# Patient Record
Sex: MALE | Race: ASIAN | NOT HISPANIC OR LATINO | URBAN - METROPOLITAN AREA
[De-identification: names, ages, dates, MRNs, and addresses within clinical notes are randomized per-mention and may not be internally consistent; named-entity substitution may affect disease eponyms.]

---

## 2017-08-03 ENCOUNTER — EMERGENCY (EMERGENCY)
Facility: HOSPITAL | Age: 57
LOS: 1 days | Discharge: HOME | End: 2017-08-03
Attending: EMERGENCY MEDICINE | Admitting: EMERGENCY MEDICINE
Payer: MEDICAID

## 2017-08-03 VITALS
TEMPERATURE: 98 F | HEART RATE: 94 BPM | RESPIRATION RATE: 16 BRPM | DIASTOLIC BLOOD PRESSURE: 92 MMHG | OXYGEN SATURATION: 100 % | SYSTOLIC BLOOD PRESSURE: 147 MMHG

## 2017-08-03 LAB
ALBUMIN SERPL ELPH-MCNC: 4.2 G/DL — SIGNIFICANT CHANGE UP (ref 3.3–5)
ALP SERPL-CCNC: 101 U/L — SIGNIFICANT CHANGE UP (ref 40–120)
ALT FLD-CCNC: 216 U/L — HIGH (ref 4–41)
APTT BLD: 34.5 SEC — SIGNIFICANT CHANGE UP (ref 27.5–37.4)
AST SERPL-CCNC: 211 U/L — HIGH (ref 4–40)
BASOPHILS # BLD AUTO: 0.08 K/UL — SIGNIFICANT CHANGE UP (ref 0–0.2)
BASOPHILS NFR BLD AUTO: 1.1 % — SIGNIFICANT CHANGE UP (ref 0–2)
BILIRUB SERPL-MCNC: 0.6 MG/DL — SIGNIFICANT CHANGE UP (ref 0.2–1.2)
BUN SERPL-MCNC: 7 MG/DL — SIGNIFICANT CHANGE UP (ref 7–23)
CALCIUM SERPL-MCNC: 8.9 MG/DL — SIGNIFICANT CHANGE UP (ref 8.4–10.5)
CHLORIDE SERPL-SCNC: 103 MMOL/L — SIGNIFICANT CHANGE UP (ref 98–107)
CK MB BLD-MCNC: 0.4 — SIGNIFICANT CHANGE UP (ref 0–2.5)
CK MB BLD-MCNC: 5 NG/ML — SIGNIFICANT CHANGE UP (ref 1–6.6)
CK SERPL-CCNC: 1216 U/L — HIGH (ref 30–200)
CO2 SERPL-SCNC: 29 MMOL/L — SIGNIFICANT CHANGE UP (ref 22–31)
CREAT SERPL-MCNC: 0.81 MG/DL — SIGNIFICANT CHANGE UP (ref 0.5–1.3)
EOSINOPHIL # BLD AUTO: 0.26 K/UL — SIGNIFICANT CHANGE UP (ref 0–0.5)
EOSINOPHIL NFR BLD AUTO: 3.7 % — SIGNIFICANT CHANGE UP (ref 0–6)
GLUCOSE SERPL-MCNC: 160 MG/DL — HIGH (ref 70–99)
HCT VFR BLD CALC: 43.5 % — SIGNIFICANT CHANGE UP (ref 39–50)
HGB BLD-MCNC: 14.8 G/DL — SIGNIFICANT CHANGE UP (ref 13–17)
IMM GRANULOCYTES # BLD AUTO: 0.01 # — SIGNIFICANT CHANGE UP
IMM GRANULOCYTES NFR BLD AUTO: 0.1 % — SIGNIFICANT CHANGE UP (ref 0–1.5)
INR BLD: 1.02 — SIGNIFICANT CHANGE UP (ref 0.88–1.17)
LIDOCAIN IGE QN: 781.6 U/L — HIGH (ref 7–60)
LYMPHOCYTES # BLD AUTO: 2.95 K/UL — SIGNIFICANT CHANGE UP (ref 1–3.3)
LYMPHOCYTES # BLD AUTO: 41.7 % — SIGNIFICANT CHANGE UP (ref 13–44)
MCHC RBC-ENTMCNC: 32 PG — SIGNIFICANT CHANGE UP (ref 27–34)
MCHC RBC-ENTMCNC: 34 % — SIGNIFICANT CHANGE UP (ref 32–36)
MCV RBC AUTO: 94.2 FL — SIGNIFICANT CHANGE UP (ref 80–100)
MONOCYTES # BLD AUTO: 0.71 K/UL — SIGNIFICANT CHANGE UP (ref 0–0.9)
MONOCYTES NFR BLD AUTO: 10 % — SIGNIFICANT CHANGE UP (ref 2–14)
NEUTROPHILS # BLD AUTO: 3.07 K/UL — SIGNIFICANT CHANGE UP (ref 1.8–7.4)
NEUTROPHILS NFR BLD AUTO: 43.4 % — SIGNIFICANT CHANGE UP (ref 43–77)
NRBC # FLD: 0 — SIGNIFICANT CHANGE UP
PLATELET # BLD AUTO: 212 K/UL — SIGNIFICANT CHANGE UP (ref 150–400)
PMV BLD: 9.4 FL — SIGNIFICANT CHANGE UP (ref 7–13)
POTASSIUM SERPL-MCNC: 3.4 MMOL/L — LOW (ref 3.5–5.3)
POTASSIUM SERPL-SCNC: 3.4 MMOL/L — LOW (ref 3.5–5.3)
PROT SERPL-MCNC: 7.4 G/DL — SIGNIFICANT CHANGE UP (ref 6–8.3)
PROTHROM AB SERPL-ACNC: 11.4 SEC — SIGNIFICANT CHANGE UP (ref 9.8–13.1)
RBC # BLD: 4.62 M/UL — SIGNIFICANT CHANGE UP (ref 4.2–5.8)
RBC # FLD: 15.5 % — HIGH (ref 10.3–14.5)
SODIUM SERPL-SCNC: 147 MMOL/L — HIGH (ref 135–145)
TROPONIN T SERPL-MCNC: < 0.06 NG/ML — SIGNIFICANT CHANGE UP (ref 0–0.06)
TSH SERPL-MCNC: 0.94 UIU/ML — SIGNIFICANT CHANGE UP (ref 0.27–4.2)
WBC # BLD: 7.08 K/UL — SIGNIFICANT CHANGE UP (ref 3.8–10.5)
WBC # FLD AUTO: 7.08 K/UL — SIGNIFICANT CHANGE UP (ref 3.8–10.5)

## 2017-08-03 PROCEDURE — 99284 EMERGENCY DEPT VISIT MOD MDM: CPT

## 2017-08-03 PROCEDURE — 71020: CPT | Mod: 26

## 2017-08-03 RX ORDER — THIAMINE MONONITRATE (VIT B1) 100 MG
100 TABLET ORAL ONCE
Qty: 0 | Refills: 0 | Status: COMPLETED | OUTPATIENT
Start: 2017-08-03 | End: 2017-08-03

## 2017-08-03 RX ORDER — SODIUM CHLORIDE 9 MG/ML
1000 INJECTION INTRAMUSCULAR; INTRAVENOUS; SUBCUTANEOUS ONCE
Qty: 0 | Refills: 0 | Status: COMPLETED | OUTPATIENT
Start: 2017-08-03 | End: 2017-08-03

## 2017-08-03 RX ORDER — ASPIRIN/CALCIUM CARB/MAGNESIUM 324 MG
162 TABLET ORAL ONCE
Qty: 0 | Refills: 0 | Status: COMPLETED | OUTPATIENT
Start: 2017-08-03 | End: 2017-08-03

## 2017-08-03 RX ORDER — FAMOTIDINE 10 MG/ML
20 INJECTION INTRAVENOUS ONCE
Qty: 0 | Refills: 0 | Status: COMPLETED | OUTPATIENT
Start: 2017-08-03 | End: 2017-08-03

## 2017-08-03 RX ADMIN — FAMOTIDINE 20 MILLIGRAM(S): 10 INJECTION INTRAVENOUS at 22:54

## 2017-08-03 RX ADMIN — Medication 30 MILLILITER(S): at 22:54

## 2017-08-03 RX ADMIN — Medication 162 MILLIGRAM(S): at 22:54

## 2017-08-03 RX ADMIN — SODIUM CHLORIDE 1000 MILLILITER(S): 9 INJECTION INTRAMUSCULAR; INTRAVENOUS; SUBCUTANEOUS at 22:54

## 2017-08-03 RX ADMIN — Medication 100 MILLIGRAM(S): at 22:59

## 2017-08-03 NOTE — ED PROVIDER NOTE - CARE PLAN
Principal Discharge DX:	Pancreatitis, unspecified pancreatitis type  Instructions for follow-up, activity and diet:	You were seen in the emergency department for chest pain. Your bloodwork showed signs of pancreatitis and hepatitis as well as an elevated blood alcohol level. You were treated with fluids and observed in the ER until found to be sober. Discussed with you were the association of these lab values with excessive alcohol drinking. Please follow up with GI doctor and consider enrolling in an alcohol rehab program. If your symptoms worsen, you develop fevers, nausea, or vomiting, or you have worsening chest pain please return to the emergency department.  Secondary Diagnosis:	Anxiety

## 2017-08-03 NOTE — ED PROVIDER NOTE - PROGRESS NOTE DETAILS
Even though patient had elevated lipase (unclear if acute or chronic), he had no nausea or vomiting and was able to tolerate PO, so was deemed stable for discharge.

## 2017-08-03 NOTE — ED ADULT NURSE NOTE - OBJECTIVE STATEMENT
pt a&ox3 and ambulatory. pt c/o cp and worsening depression x1 week. pt states he has been feeling depressed and anxious the past week more so than usual.  pt denies SI/HI pt see a psychiatrist and is on clonopin. pt also states he drinks 1 beer a day. pt noted to have unsteady gait while walking into rm. pt denies sob, abdominal pain, nvd, fever/chills. 20g IV placed in rt AC. Labs sent. Will continue to monitor.

## 2017-08-03 NOTE — ED ADULT TRIAGE NOTE - CHIEF COMPLAINT QUOTE
c/o SOB, chest pain non radiating x 4 days, unsteady gait x 2 days, patient drinks alcohol every other day as per brother.  PMH: HTN, depression,

## 2017-08-03 NOTE — ED PROVIDER NOTE - MEDICAL DECISION MAKING DETAILS
58 yo m HTN and anxiety here for pressure-like chest pain. Unlikely ACS given absence of pressure-like cp/SOB/nausea/SAMSON, normal EKG, and relief by carbonated beverage. Likely related to reflux. Will check trops and give aspirin to cover. Pepcid and maalox. CMP, CBC, lipase, TSH, CXR

## 2017-08-03 NOTE — ED PROVIDER NOTE - PLAN OF CARE
You were seen in the emergency department for chest pain. Your bloodwork showed signs of pancreatitis and hepatitis as well as an elevated blood alcohol level. You were treated with fluids and observed in the ER until found to be sober. Discussed with you were the association of these lab values with excessive alcohol drinking. Please follow up with GI doctor and consider enrolling in an alcohol rehab program. If your symptoms worsen, you develop fevers, nausea, or vomiting, or you have worsening chest pain please return to the emergency department.

## 2017-08-03 NOTE — ED PROVIDER NOTE - OBJECTIVE STATEMENT
57-year-old male w history hypertension and anxiety complaining of 2 days chest pain. Patient describes pain as mid-chest, "gas-like", intermittent, non-radiating, relieved by club soda and belching. Denies SOB, palpitations, nausea, cough, abdominal pain. Denies recent fevers or URI symptoms. Is not currently having any pain. Patient's brother also mentions concern that patient seems unsteadily when walking, and points out that he drinks regularly, ~1 pint hard liquor daily.

## 2017-08-04 VITALS
RESPIRATION RATE: 16 BRPM | TEMPERATURE: 98 F | HEART RATE: 87 BPM | DIASTOLIC BLOOD PRESSURE: 87 MMHG | OXYGEN SATURATION: 95 % | SYSTOLIC BLOOD PRESSURE: 154 MMHG

## 2017-08-04 LAB
APAP SERPL-MCNC: < 15 UG/ML — LOW (ref 15–25)
BARBITURATES MEASUREMENT: NEGATIVE — SIGNIFICANT CHANGE UP
BENZODIAZ SERPL-MCNC: NEGATIVE — SIGNIFICANT CHANGE UP
ETHANOL BLD-MCNC: 352 MG/DL — HIGH
SALICYLATES SERPL-MCNC: < 5 MG/DL — LOW (ref 15–30)

## 2017-08-04 RX ORDER — SODIUM CHLORIDE 9 MG/ML
1000 INJECTION INTRAMUSCULAR; INTRAVENOUS; SUBCUTANEOUS ONCE
Qty: 0 | Refills: 0 | Status: COMPLETED | OUTPATIENT
Start: 2017-08-04 | End: 2017-08-04

## 2017-08-04 RX ADMIN — SODIUM CHLORIDE 1000 MILLILITER(S): 9 INJECTION INTRAMUSCULAR; INTRAVENOUS; SUBCUTANEOUS at 00:52

## 2018-09-06 ENCOUNTER — INPATIENT (INPATIENT)
Facility: HOSPITAL | Age: 58
LOS: 3 days | Discharge: HOME | DRG: 812 | End: 2018-09-10
Attending: HOSPITALIST | Admitting: HOSPITALIST
Payer: COMMERCIAL

## 2018-09-06 VITALS
TEMPERATURE: 98 F | OXYGEN SATURATION: 97 % | HEIGHT: 68 IN | WEIGHT: 149.91 LBS | HEART RATE: 76 BPM | DIASTOLIC BLOOD PRESSURE: 66 MMHG | RESPIRATION RATE: 18 BRPM | SYSTOLIC BLOOD PRESSURE: 110 MMHG

## 2018-09-06 NOTE — ED ADULT NURSE NOTE - NSIMPLEMENTINTERV_GEN_ALL_ED
Implemented All Fall Risk Interventions:  [place] to call system. Call bell, personal items and telephone within reach. Instruct patient to call for assistance. Room bathroom lighting operational. Non-slip footwear when patient is off stretcher. Physically safe environment: no spills, clutter or unnecessary equipment. Stretcher in lowest position, wheels locked, appropriate side rails in place. Provide visual cue, wrist band, yellow gown, etc. Monitor gait and stability. Monitor for mental status changes and reorient to person, place, and time. Review medications for side effects contributing to fall risk. Reinforce activity limits and safety measures with patient and family.

## 2018-09-06 NOTE — ED ADULT NURSE NOTE - ED STAT RN HANDOFF DETAILS
received critiocal  result form lab poptasium 2.5 dr. hodges aware pt.remained  stable. started 1 st unit of PRBC at 5: 40am via pump with no a/r noted.received critical  result form lab potasium 2.5 dr. hodges aware. medication given as ordered.pt.not in distress

## 2018-09-07 DIAGNOSIS — F41.9 ANXIETY DISORDER, UNSPECIFIED: ICD-10-CM

## 2018-09-07 DIAGNOSIS — Z29.9 ENCOUNTER FOR PROPHYLACTIC MEASURES, UNSPECIFIED: ICD-10-CM

## 2018-09-07 DIAGNOSIS — F10.10 ALCOHOL ABUSE, UNCOMPLICATED: ICD-10-CM

## 2018-09-07 DIAGNOSIS — E88.09 OTHER DISORDERS OF PLASMA-PROTEIN METABOLISM, NOT ELSEWHERE CLASSIFIED: ICD-10-CM

## 2018-09-07 DIAGNOSIS — D64.9 ANEMIA, UNSPECIFIED: ICD-10-CM

## 2018-09-07 DIAGNOSIS — E87.6 HYPOKALEMIA: ICD-10-CM

## 2018-09-07 DIAGNOSIS — I10 ESSENTIAL (PRIMARY) HYPERTENSION: ICD-10-CM

## 2018-09-07 LAB
24R-OH-CALCIDIOL SERPL-MCNC: 26.4 NG/ML — LOW (ref 30–80)
ABO RH CONFIRMATION: SIGNIFICANT CHANGE UP
ALBUMIN SERPL ELPH-MCNC: 2.4 G/DL — LOW (ref 3.5–5)
ALP SERPL-CCNC: 159 U/L — HIGH (ref 40–120)
ALT FLD-CCNC: 39 U/L DA — SIGNIFICANT CHANGE UP (ref 10–60)
ANION GAP SERPL CALC-SCNC: 9 MMOL/L — SIGNIFICANT CHANGE UP (ref 5–17)
APTT BLD: 34.8 SEC — SIGNIFICANT CHANGE UP (ref 27.5–37.4)
AST SERPL-CCNC: 83 U/L — HIGH (ref 10–40)
BASOPHILS # BLD AUTO: 0.1 K/UL — SIGNIFICANT CHANGE UP (ref 0–0.2)
BASOPHILS # BLD AUTO: 0.1 K/UL — SIGNIFICANT CHANGE UP (ref 0–0.2)
BASOPHILS NFR BLD AUTO: 1.6 % — SIGNIFICANT CHANGE UP (ref 0–2)
BASOPHILS NFR BLD AUTO: 1.9 % — SIGNIFICANT CHANGE UP (ref 0–2)
BILIRUB SERPL-MCNC: 1.1 MG/DL — SIGNIFICANT CHANGE UP (ref 0.2–1.2)
BUN SERPL-MCNC: 4 MG/DL — LOW (ref 7–18)
BUN SERPL-MCNC: 6 MG/DL — LOW (ref 7–18)
BUN SERPL-MCNC: 7 MG/DL — SIGNIFICANT CHANGE UP (ref 7–18)
CALCIUM SERPL-MCNC: 7.6 MG/DL — LOW (ref 8.4–10.5)
CALCIUM SERPL-MCNC: 7.7 MG/DL — LOW (ref 8.4–10.5)
CALCIUM SERPL-MCNC: 7.8 MG/DL — LOW (ref 8.4–10.5)
CHLORIDE SERPL-SCNC: 105 MMOL/L — SIGNIFICANT CHANGE UP (ref 96–108)
CHLORIDE SERPL-SCNC: 105 MMOL/L — SIGNIFICANT CHANGE UP (ref 96–108)
CHLORIDE SERPL-SCNC: 111 MMOL/L — HIGH (ref 96–108)
CHOLEST SERPL-MCNC: 168 MG/DL — SIGNIFICANT CHANGE UP (ref 10–199)
CO2 SERPL-SCNC: 26 MMOL/L — SIGNIFICANT CHANGE UP (ref 22–31)
CO2 SERPL-SCNC: 28 MMOL/L — SIGNIFICANT CHANGE UP (ref 22–31)
CO2 SERPL-SCNC: 28 MMOL/L — SIGNIFICANT CHANGE UP (ref 22–31)
CREAT SERPL-MCNC: 0.56 MG/DL — SIGNIFICANT CHANGE UP (ref 0.5–1.3)
CREAT SERPL-MCNC: 0.69 MG/DL — SIGNIFICANT CHANGE UP (ref 0.5–1.3)
CREAT SERPL-MCNC: 0.71 MG/DL — SIGNIFICANT CHANGE UP (ref 0.5–1.3)
EOSINOPHIL # BLD AUTO: 0.1 K/UL — SIGNIFICANT CHANGE UP (ref 0–0.5)
EOSINOPHIL # BLD AUTO: 0.2 K/UL — SIGNIFICANT CHANGE UP (ref 0–0.5)
EOSINOPHIL NFR BLD AUTO: 1.7 % — SIGNIFICANT CHANGE UP (ref 0–6)
EOSINOPHIL NFR BLD AUTO: 2.8 % — SIGNIFICANT CHANGE UP (ref 0–6)
ETHANOL SERPL-MCNC: 246 MG/DL — HIGH (ref 0–10)
FERRITIN SERPL-MCNC: 32 NG/ML — SIGNIFICANT CHANGE UP (ref 30–400)
GLUCOSE SERPL-MCNC: 109 MG/DL — HIGH (ref 70–99)
GLUCOSE SERPL-MCNC: 90 MG/DL — SIGNIFICANT CHANGE UP (ref 70–99)
GLUCOSE SERPL-MCNC: 98 MG/DL — SIGNIFICANT CHANGE UP (ref 70–99)
HBA1C BLD-MCNC: 4.7 % — SIGNIFICANT CHANGE UP (ref 4–5.6)
HCT VFR BLD CALC: 22.4 % — LOW (ref 39–50)
HCT VFR BLD CALC: 22.7 % — LOW (ref 39–50)
HCT VFR BLD CALC: 28.5 % — LOW (ref 39–50)
HDLC SERPL-MCNC: 49 MG/DL — SIGNIFICANT CHANGE UP
HGB BLD-MCNC: 6.2 G/DL — CRITICAL LOW (ref 13–17)
HGB BLD-MCNC: 6.2 G/DL — CRITICAL LOW (ref 13–17)
HGB BLD-MCNC: 8.3 G/DL — LOW (ref 13–17)
INR BLD: 1.31 RATIO — HIGH (ref 0.88–1.16)
IRON SATN MFR SERPL: 14 UG/DL — LOW (ref 65–170)
IRON SATN MFR SERPL: 3 % — LOW (ref 20–55)
LDH SERPL L TO P-CCNC: 266 U/L — HIGH (ref 120–225)
LIPID PNL WITH DIRECT LDL SERPL: 96 MG/DL — SIGNIFICANT CHANGE UP
LYMPHOCYTES # BLD AUTO: 2.1 K/UL — SIGNIFICANT CHANGE UP (ref 1–3.3)
LYMPHOCYTES # BLD AUTO: 2.6 K/UL — SIGNIFICANT CHANGE UP (ref 1–3.3)
LYMPHOCYTES # BLD AUTO: 26.5 % — SIGNIFICANT CHANGE UP (ref 13–44)
LYMPHOCYTES # BLD AUTO: 32.9 % — SIGNIFICANT CHANGE UP (ref 13–44)
MAGNESIUM SERPL-MCNC: 1.8 MG/DL — SIGNIFICANT CHANGE UP (ref 1.6–2.6)
MAGNESIUM SERPL-MCNC: 1.9 MG/DL — SIGNIFICANT CHANGE UP (ref 1.6–2.6)
MCHC RBC-ENTMCNC: 23.4 PG — LOW (ref 27–34)
MCHC RBC-ENTMCNC: 23.5 PG — LOW (ref 27–34)
MCHC RBC-ENTMCNC: 24.9 PG — LOW (ref 27–34)
MCHC RBC-ENTMCNC: 27.5 GM/DL — LOW (ref 32–36)
MCHC RBC-ENTMCNC: 27.7 GM/DL — LOW (ref 32–36)
MCHC RBC-ENTMCNC: 29.2 GM/DL — LOW (ref 32–36)
MCV RBC AUTO: 85 FL — SIGNIFICANT CHANGE UP (ref 80–100)
MCV RBC AUTO: 85.2 FL — SIGNIFICANT CHANGE UP (ref 80–100)
MCV RBC AUTO: 85.4 FL — SIGNIFICANT CHANGE UP (ref 80–100)
MONOCYTES # BLD AUTO: 0.6 K/UL — SIGNIFICANT CHANGE UP (ref 0–0.9)
MONOCYTES # BLD AUTO: 0.6 K/UL — SIGNIFICANT CHANGE UP (ref 0–0.9)
MONOCYTES NFR BLD AUTO: 7.6 % — SIGNIFICANT CHANGE UP (ref 2–14)
MONOCYTES NFR BLD AUTO: 7.6 % — SIGNIFICANT CHANGE UP (ref 2–14)
NEUTROPHILS # BLD AUTO: 4.3 K/UL — SIGNIFICANT CHANGE UP (ref 1.8–7.4)
NEUTROPHILS # BLD AUTO: 4.9 K/UL — SIGNIFICANT CHANGE UP (ref 1.8–7.4)
NEUTROPHILS NFR BLD AUTO: 55.1 % — SIGNIFICANT CHANGE UP (ref 43–77)
NEUTROPHILS NFR BLD AUTO: 62.3 % — SIGNIFICANT CHANGE UP (ref 43–77)
OB PNL STL: NEGATIVE — SIGNIFICANT CHANGE UP
PHOSPHATE SERPL-MCNC: 3.8 MG/DL — SIGNIFICANT CHANGE UP (ref 2.5–4.5)
PLATELET # BLD AUTO: 217 K/UL — SIGNIFICANT CHANGE UP (ref 150–400)
PLATELET # BLD AUTO: 220 K/UL — SIGNIFICANT CHANGE UP (ref 150–400)
PLATELET # BLD AUTO: 242 K/UL — SIGNIFICANT CHANGE UP (ref 150–400)
POTASSIUM SERPL-MCNC: 2.5 MMOL/L — CRITICAL LOW (ref 3.5–5.3)
POTASSIUM SERPL-MCNC: 2.6 MMOL/L — CRITICAL LOW (ref 3.5–5.3)
POTASSIUM SERPL-MCNC: 3.3 MMOL/L — LOW (ref 3.5–5.3)
POTASSIUM SERPL-SCNC: 2.5 MMOL/L — CRITICAL LOW (ref 3.5–5.3)
POTASSIUM SERPL-SCNC: 2.6 MMOL/L — CRITICAL LOW (ref 3.5–5.3)
POTASSIUM SERPL-SCNC: 3.3 MMOL/L — LOW (ref 3.5–5.3)
PROT SERPL-MCNC: 7.2 G/DL — SIGNIFICANT CHANGE UP (ref 6–8.3)
PROTHROM AB SERPL-ACNC: 14.4 SEC — HIGH (ref 9.8–12.7)
RBC # BLD: 2.64 M/UL — LOW (ref 4.2–5.8)
RBC # BLD: 2.66 M/UL — LOW (ref 4.2–5.8)
RBC # BLD: 3.35 M/UL — LOW (ref 4.2–5.8)
RBC # FLD: 17.6 % — HIGH (ref 10.3–14.5)
RBC # FLD: 18.2 % — HIGH (ref 10.3–14.5)
RBC # FLD: 18.4 % — HIGH (ref 10.3–14.5)
SODIUM SERPL-SCNC: 142 MMOL/L — SIGNIFICANT CHANGE UP (ref 135–145)
SODIUM SERPL-SCNC: 142 MMOL/L — SIGNIFICANT CHANGE UP (ref 135–145)
SODIUM SERPL-SCNC: 146 MMOL/L — HIGH (ref 135–145)
TIBC SERPL-MCNC: 426 UG/DL — SIGNIFICANT CHANGE UP (ref 250–450)
TOTAL CHOLESTEROL/HDL RATIO MEASUREMENT: 3.4 RATIO — SIGNIFICANT CHANGE UP (ref 3.4–9.6)
TRANSFERRIN SERPL-MCNC: 341 MG/DL — SIGNIFICANT CHANGE UP (ref 200–360)
TRIGL SERPL-MCNC: 116 MG/DL — SIGNIFICANT CHANGE UP (ref 10–149)
TSH SERPL-MCNC: 2.12 UU/ML — SIGNIFICANT CHANGE UP (ref 0.34–4.82)
UIBC SERPL-MCNC: 412 UG/DL — HIGH (ref 110–370)
WBC # BLD: 6.1 K/UL — SIGNIFICANT CHANGE UP (ref 3.8–10.5)
WBC # BLD: 7.9 K/UL — SIGNIFICANT CHANGE UP (ref 3.8–10.5)
WBC # BLD: 7.9 K/UL — SIGNIFICANT CHANGE UP (ref 3.8–10.5)
WBC # FLD AUTO: 6.1 K/UL — SIGNIFICANT CHANGE UP (ref 3.8–10.5)
WBC # FLD AUTO: 7.9 K/UL — SIGNIFICANT CHANGE UP (ref 3.8–10.5)
WBC # FLD AUTO: 7.9 K/UL — SIGNIFICANT CHANGE UP (ref 3.8–10.5)

## 2018-09-07 PROCEDURE — 99285 EMERGENCY DEPT VISIT HI MDM: CPT | Mod: 25

## 2018-09-07 PROCEDURE — 99223 1ST HOSP IP/OBS HIGH 75: CPT | Mod: GC

## 2018-09-07 PROCEDURE — 76705 ECHO EXAM OF ABDOMEN: CPT | Mod: 26

## 2018-09-07 RX ORDER — POTASSIUM CHLORIDE 20 MEQ
40 PACKET (EA) ORAL ONCE
Qty: 0 | Refills: 0 | Status: COMPLETED | OUTPATIENT
Start: 2018-09-07 | End: 2018-09-07

## 2018-09-07 RX ORDER — POTASSIUM CHLORIDE 20 MEQ
10 PACKET (EA) ORAL
Qty: 0 | Refills: 0 | Status: COMPLETED | OUTPATIENT
Start: 2018-09-07 | End: 2018-09-07

## 2018-09-07 RX ORDER — SODIUM CHLORIDE 9 MG/ML
1000 INJECTION, SOLUTION INTRAVENOUS
Qty: 0 | Refills: 0 | Status: DISCONTINUED | OUTPATIENT
Start: 2018-09-07 | End: 2018-09-10

## 2018-09-07 RX ORDER — POTASSIUM CHLORIDE 20 MEQ
10 PACKET (EA) ORAL
Qty: 0 | Refills: 0 | Status: DISCONTINUED | OUTPATIENT
Start: 2018-09-07 | End: 2018-09-07

## 2018-09-07 RX ORDER — POTASSIUM CHLORIDE 20 MEQ
20 PACKET (EA) ORAL
Qty: 0 | Refills: 0 | Status: COMPLETED | OUTPATIENT
Start: 2018-09-07 | End: 2018-09-07

## 2018-09-07 RX ORDER — ERGOCALCIFEROL 1.25 MG/1
50000 CAPSULE ORAL
Qty: 0 | Refills: 0 | Status: DISCONTINUED | OUTPATIENT
Start: 2018-09-07 | End: 2018-09-10

## 2018-09-07 RX ORDER — PANTOPRAZOLE SODIUM 20 MG/1
40 TABLET, DELAYED RELEASE ORAL EVERY 12 HOURS
Qty: 0 | Refills: 0 | Status: DISCONTINUED | OUTPATIENT
Start: 2018-09-07 | End: 2018-09-07

## 2018-09-07 RX ORDER — THIAMINE MONONITRATE (VIT B1) 100 MG
100 TABLET ORAL DAILY
Qty: 0 | Refills: 0 | Status: COMPLETED | OUTPATIENT
Start: 2018-09-07 | End: 2018-09-09

## 2018-09-07 RX ORDER — FOLIC ACID 0.8 MG
1 TABLET ORAL DAILY
Qty: 0 | Refills: 0 | Status: DISCONTINUED | OUTPATIENT
Start: 2018-09-07 | End: 2018-09-10

## 2018-09-07 RX ORDER — PANTOPRAZOLE SODIUM 20 MG/1
40 TABLET, DELAYED RELEASE ORAL
Qty: 0 | Refills: 0 | Status: DISCONTINUED | OUTPATIENT
Start: 2018-09-07 | End: 2018-09-10

## 2018-09-07 RX ORDER — PANTOPRAZOLE SODIUM 20 MG/1
40 TABLET, DELAYED RELEASE ORAL EVERY 24 HOURS
Qty: 0 | Refills: 0 | Status: DISCONTINUED | OUTPATIENT
Start: 2018-09-07 | End: 2018-09-07

## 2018-09-07 RX ADMIN — SODIUM CHLORIDE 100 MILLILITER(S): 9 INJECTION, SOLUTION INTRAVENOUS at 10:10

## 2018-09-07 RX ADMIN — Medication 40 MILLIEQUIVALENT(S): at 05:43

## 2018-09-07 RX ADMIN — Medication 20 MILLIEQUIVALENT(S): at 15:23

## 2018-09-07 RX ADMIN — Medication 1 MILLIGRAM(S): at 13:41

## 2018-09-07 RX ADMIN — SODIUM CHLORIDE 80 MILLILITER(S): 9 INJECTION, SOLUTION INTRAVENOUS at 06:00

## 2018-09-07 RX ADMIN — Medication 100 MILLIEQUIVALENT(S): at 07:56

## 2018-09-07 RX ADMIN — Medication 1 MILLIGRAM(S): at 18:42

## 2018-09-07 RX ADMIN — Medication 100 MILLIEQUIVALENT(S): at 09:03

## 2018-09-07 RX ADMIN — PANTOPRAZOLE SODIUM 40 MILLIGRAM(S): 20 TABLET, DELAYED RELEASE ORAL at 13:41

## 2018-09-07 RX ADMIN — Medication 40 MILLIEQUIVALENT(S): at 06:44

## 2018-09-07 RX ADMIN — Medication 100 MILLIGRAM(S): at 15:23

## 2018-09-07 RX ADMIN — Medication 2 MILLIGRAM(S): at 13:46

## 2018-09-07 RX ADMIN — Medication 100 MILLIEQUIVALENT(S): at 05:45

## 2018-09-07 RX ADMIN — Medication 20 MILLIEQUIVALENT(S): at 18:41

## 2018-09-07 RX ADMIN — PANTOPRAZOLE SODIUM 40 MILLIGRAM(S): 20 TABLET, DELAYED RELEASE ORAL at 05:45

## 2018-09-07 NOTE — H&P ADULT - ATTENDING COMMENTS
Patient was seen and examined by myself. Case was discussed with house staff in details. I have reviewed and agree with the plan as outlined above with edits where appropriate. Patient was seen and examined by myself. Case was discussed with house staff in details. I have reviewed and agree with the plan as outlined above with edits where appropriate.    57 y/o M admitted for   1. Anemia with generalizes weakness  2. ETOH abuse with onset of withdrawal symptoms  3. Protein calorie malnutrition  4. Liver cirrhosis due to ETOH  5. Hypoalbuminemia  6. Hypokalemia  7. Ascites  8. Cholelithiasis  9. Poor oral intake    Plan as outlined above.  CIWA-A protocol  Ativan standing and PRN  Monitor hb  ETOH cessation counselling.  Prognosis is guarded.  Discussed with patient 's brother over the phone- longstanding alcoholic and non compliant with medical recommendations, follow up and care.  Patient requested to be discharge however he is in active withdrawal and not stable for discharge,. He verbalizes understanding and is agreeable with need for continued hospitalization at this time.

## 2018-09-07 NOTE — ED PROVIDER NOTE - MEDICAL DECISION MAKING DETAILS
57 yo M with etoh abuse and found to have low Hgb on out patient labs. Sent in for evaluation and possible transfusion.

## 2018-09-07 NOTE — H&P ADULT - ASSESSMENT
58 male with PMH of HTN , Anxiety, was sent in by PCP for low Hb. Patient went to some hospital , was found to have low hb and discharged without any intervention. Patient then went to PCP's office and found to have level of 6.2 and was sent in here.   Pt is somnolent and clinically intoxicated and unable to provide any history.    In Ed , BP : 110/66 mm hg , Hr : 76 , Temp : 97.6 F  cbc shows hb of 6.2 with normal MCV , low MCH/ MCHC and elevated RDW  bmp shows K of 2.6 ; EKG did not reveal any changes       Will admit to telemetry for Symptomatic Anemia , alcohol intoxication and Electrolyte abnormalities.

## 2018-09-07 NOTE — H&P ADULT - PROBLEM SELECTOR PLAN 1
Comes in with low H/H , no obvious signs of Gi /  bleed in history or physical   Occult negative   f/u anemia panel   Likely a combination of [place] form poor po intake and Anemia related to Alcoholism  Keep NPO   Start iv protonix bid  GI consult   Transfuse 1 unit to keep hb >7   Trend cbc Comes in with low H/H , no obvious signs of Gi /  bleed in history or physical   Occult negative   f/u anemia panel   Likely a combination of [place] form poor po intake and Anemia related to Alcoholism  Keep NPO   Start iv protonix  Transfuse 1 unit to keep hb >7   Trend cbc

## 2018-09-07 NOTE — ED PROVIDER NOTE - PROGRESS NOTE DETAILS
Hgb 6.2  1 unit PRBC's ordered. Pt's brother consented.   Will admit for transfusion for symptomatic anemia. Endorsed to MAR.

## 2018-09-07 NOTE — ED PROVIDER NOTE - OBJECTIVE STATEMENT
59 yo M pmh of anxiety/depression, HTN, and etoh abuse presents with family who state that he had out patient labs indicating anemia and his PCP told family to take him to ED. Pt is somnolent and clinically intoxicated and unable to provide any history.

## 2018-09-07 NOTE — ED PROVIDER NOTE - PHYSICAL EXAMINATION
GENERAL: wells appearing, no acute distress, somnolent, arouses to noxious stimuli and makes eye contact but not verbally answering questions, etoh on breath   HEAD: atraumatic   EYES: EOMI, pink conjunctiva   ENT: moist oral mucosa   CARDIAC: RRR, no edema, distal pulses present   RESPIRATORY: lungs CTAB, no increased work of breathing   GASTROINTESTINAL: no abdominal tenderness, no rebound or guarding, bowel sounds presents  GENITOURINARY: no CVA tenderness   MUSCULOSKELETAL: no deformity   NEUROLOGICAL: spontaneous movement of extremities, protecting airway   SKIN: intact   PSYCHIATRIC: cooperative  HEME LYMPH: no lymphadenopathy

## 2018-09-07 NOTE — H&P ADULT - PROBLEM SELECTOR PLAN 3
Likely from poor po intake and alcoholism   Replace aggressively   EKG wnl   f/u bmp closely  Monitor on tele

## 2018-09-07 NOTE — H&P ADULT - PROBLEM SELECTOR PLAN 6
IMPROVE VTE Individual Risk Assessment          RISK                                                          Points  [  ] Previous VTE                                                3  [  ] Thrombophilia                                             2  [  ] Lower limb paralysis                                   2        (unable to hold up >15 seconds)    [  ] Current Cancer                                             2         (within 6 months)  [x  ] Immobilization > 24 hrs                              1  [  ] ICU/CCU stay > 24 hours                             1  [  ] Age > 60                                                         1    IMPROVE VTE Score: 1   High risk , but no chemical ac due to anemia   scd boots Admits to h/o Anxiety   Does not remember his meds   Primary team to address

## 2018-09-07 NOTE — H&P ADULT - PROBLEM SELECTOR PLAN 7
IMPROVE VTE Individual Risk Assessment          RISK                                                          Points  [  ] Previous VTE                                                3  [  ] Thrombophilia                                             2  [  ] Lower limb paralysis                                   2        (unable to hold up >15 seconds)    [  ] Current Cancer                                             2         (within 6 months)  [x  ] Immobilization > 24 hrs                              1  [  ] ICU/CCU stay > 24 hours                             1  [  ] Age > 60                                                         1    IMPROVE VTE Score: 1   High risk , but no chemical ac due to anemia   scd boots

## 2018-09-07 NOTE — H&P ADULT - NSHPPHYSICALEXAM_GEN_ALL_CORE
Vital Signs Last 24 Hrs  T(C): 36.4 (06 Sep 2018 23:11), Max: 36.4 (06 Sep 2018 23:11)  T(F): 97.6 (06 Sep 2018 23:11), Max: 97.6 (06 Sep 2018 23:11)  HR: 76 (06 Sep 2018 23:11) (76 - 76)  BP: 110/66 (06 Sep 2018 23:11) (110/66 - 110/66)  BP(mean): --  RR: 18 (06 Sep 2018 23:11) (18 - 18)  SpO2: 97% (06 Sep 2018 23:11) (97% - 97%)    Constitutional : dishevel  Eyes : EOMI; PERRL; no drainage or redness  Neck: No bruits; no thyromegaly   Back : No deformity or limitation of movement  Respiratory : Breath Sounds equal & clear to percussion & auscultation, no accessory muscle use  Cardiovascular :Regular rate & rhythm, normal S1, S2; no murmurs, gallops or rubs  Gastrointestinal : Soft, non-tender, no hepatosplenomegaly, normal bowel sounds  Extremities : No cyanosis, clubbing or edema  Vascular	: Equal and normal pulses (carotid, dorsalis pedis)  Neurological : Intoxicated , arousable by physical stimulus , does not follow commands   Skin : No lesions; no rash  Musculoskeletal : No joint swelling or deformity; Vital Signs Last 24 Hrs  T(C): 36.4 (06 Sep 2018 23:11), Max: 36.4 (06 Sep 2018 23:11)  T(F): 97.6 (06 Sep 2018 23:11), Max: 97.6 (06 Sep 2018 23:11)  HR: 76 (06 Sep 2018 23:11) (76 - 76)  BP: 110/66 (06 Sep 2018 23:11) (110/66 - 110/66)  BP(mean): --  RR: 18 (06 Sep 2018 23:11) (18 - 18)  SpO2: 97% (06 Sep 2018 23:11) (97% - 97%)    Constitutional : dishevel  Eyes : EOMI; PERRL; no drainage or redness  Neck: No bruits; no thyromegaly   Back : No deformity or limitation of movement  Respiratory : Breath Sounds equal & clear to percussion & auscultation, no accessory muscle use  Cardiovascular :Regular rate & rhythm, normal S1, S2; no murmurs, gallops or rubs  Gastrointestinal : Soft, non-tender, no hepatosplenomegaly, normal bowel sounds  Extremities : No cyanosis, clubbing or edema  Vascular	: Equal and normal pulses (carotid, dorsalis pedis)  Neurological : initial exam , intoxicated ; later woke up now AAO*3   Skin : No lesions; no rash  Musculoskeletal : No joint swelling or deformity;

## 2018-09-07 NOTE — H&P ADULT - PROBLEM SELECTOR PLAN 5
IMPROVE VTE Individual Risk Assessment          RISK                                                          Points  [  ] Previous VTE                                                3  [  ] Thrombophilia                                             2  [  ] Lower limb paralysis                                   2        (unable to hold up >15 seconds)    [  ] Current Cancer                                             2         (within 6 months)  [x  ] Immobilization > 24 hrs                              1  [  ] ICU/CCU stay > 24 hours                             1  [  ] Age > 60                                                         1    IMPROVE VTE Score: 1   High risk , but no chemical ac due to anemia   scd boots Admits to h/o Anxiety   Does not remember his meds   Primary team to address On Amlodipine at home 5 mg OD   Monitor BP

## 2018-09-07 NOTE — H&P ADULT - FAMILY HISTORY
Father  Still living? Unknown  Family history of anxiety disorder, Age at diagnosis: Age Unknown Father  Still living? Unknown  Family history of anxiety disorder, Age at diagnosis: Age Unknown  Family history of diabetes mellitus (DM), Age at diagnosis: Age Unknown

## 2018-09-07 NOTE — H&P ADULT - PROBLEM SELECTOR PLAN 2
Intoxicated , BAL of 280   Will be high risk for withdrawal in next 24-48 hours  CIWA protocol  Ativan 2 q2 prn   Banana bag   Thiamine   Folic acid   Keep NPO for now   Social work consult

## 2018-09-07 NOTE — H&P ADULT - NSHPSOCIALHISTORY_GEN_ALL_CORE
Etoh daily , history was obtained from Brother who accompanied patient says drinks and smokes only occasionally   Denies drugs   Lives with sister

## 2018-09-07 NOTE — H&P ADULT - PROBLEM SELECTOR PLAN 4
On Amlodipine at home , unsure of doses   Primary team to address   Monitor BP On Amlodipine at home 5 mg OD   Monitor BP Albumin of 2.4 with elevated ALT , AST   suggest poor po intake   Would obtain Liver Ultrasound to rule out liver pathology   Nutrition consult

## 2018-09-07 NOTE — H&P ADULT - HISTORY OF PRESENT ILLNESS
58 male 58 male with PMH of HTN , Anxiety, was sent in by PCP for low Hb. Patient went to some hospital , was found to have low hb and discharged without any intervention. Patient then went to PCP's office and found to have level of 6.2 and was sent in here.   Pt is somnolent and clinically intoxicated and unable to provide any history.    In Ed , BP : 110/66 mm hg , Hr : 76 , Temp : 97.6 F  cbc shows hb of 6.2 with normal MCV , low MCH/ MCHC and elevated RDW  bmp shows K of 2.6 ; EKG did not reveal any changes   58 male with PMH of HTN , Anxiety, was sent in by PCP for low Hb. Patient went to some hospital , was found to have low hb and discharged without any intervention. Patient then went to PCP's office and found to have level of 6.2 and was sent in here. Patient was initially very somnolent and unable to provide any history , then woke up a little and was interviewed. Complaints of generalized weakness. Other than that denied any chest pain , sob , palpitations , dizziness , headache , melena , hematemesis or hematuria.     In Ed , BP : 110/66 mm hg , Hr : 76 , Temp : 97.6 F  cbc shows hb of 6.2 with normal MCV , low MCH/ MCHC and elevated RDW  bmp shows K of 2.6 ; EKG did not reveal any changes   58 male with PMH of HTN , Anxiety, was sent in by PCP for low Hb. Patient went to some hospital , was found to have low hb and discharged without any intervention. Patient then went to PCP's office and found to have level of 6.2 and was sent in here. Patient was initially very somnolent and unable to provide any history , then woke up a little and was interviewed. However , history is very limited from patient as intoxicated. Most history obtained from family and chart review. Complaints of generalized weakness. Other than that denied any chest pain , sob , palpitations , dizziness , headache , melena , hematemesis or hematuria.     In Ed , BP : 110/66 mm hg , Hr : 76 , Temp : 97.6 F  cbc shows hb of 6.2 with normal MCV , low MCH/ MCHC and elevated RDW  bmp shows K of 2.6 ; EKG did not reveal any changes

## 2018-09-07 NOTE — H&P ADULT - NSHPLABSRESULTS_GEN_ALL_CORE
6.2    7.9   )-----------( 220      ( 07 Sep 2018 04:21 )             22.7       09-07    142  |  105  |  6<L>  ----------------------------<  98  2.5<LL>   |  28  |  0.71    Ca    7.8<L>      07 Sep 2018 04:04  Phos  3.8     09-07  Mg     1.9     09-07    TPro  7.2  /  Alb  2.4<L>  /  TBili  1.1  /  DBili  x   /  AST  83<H>  /  ALT  39  /  AlkPhos  159<H>  09-07      Alcohol, Blood (09.07.18 @ 04:21)    Alcohol, Blood: 246: TOXIC CONCENTRATIONS (mg/dL):

## 2018-09-08 DIAGNOSIS — K70.30 ALCOHOLIC CIRRHOSIS OF LIVER WITHOUT ASCITES: ICD-10-CM

## 2018-09-08 LAB
ANION GAP SERPL CALC-SCNC: 9 MMOL/L — SIGNIFICANT CHANGE UP (ref 5–17)
BUN SERPL-MCNC: 6 MG/DL — LOW (ref 7–18)
CALCIUM SERPL-MCNC: 8 MG/DL — LOW (ref 8.4–10.5)
CHLORIDE SERPL-SCNC: 103 MMOL/L — SIGNIFICANT CHANGE UP (ref 96–108)
CO2 SERPL-SCNC: 27 MMOL/L — SIGNIFICANT CHANGE UP (ref 22–31)
CREAT SERPL-MCNC: 0.65 MG/DL — SIGNIFICANT CHANGE UP (ref 0.5–1.3)
GLUCOSE SERPL-MCNC: 110 MG/DL — HIGH (ref 70–99)
HCT VFR BLD CALC: 30 % — LOW (ref 39–50)
HGB BLD-MCNC: 8.7 G/DL — LOW (ref 13–17)
MCHC RBC-ENTMCNC: 24.6 PG — LOW (ref 27–34)
MCHC RBC-ENTMCNC: 29.1 GM/DL — LOW (ref 32–36)
MCV RBC AUTO: 84.5 FL — SIGNIFICANT CHANGE UP (ref 80–100)
PLATELET # BLD AUTO: 242 K/UL — SIGNIFICANT CHANGE UP (ref 150–400)
POTASSIUM SERPL-MCNC: 3.2 MMOL/L — LOW (ref 3.5–5.3)
POTASSIUM SERPL-SCNC: 3.2 MMOL/L — LOW (ref 3.5–5.3)
RBC # BLD: 3.55 M/UL — LOW (ref 4.2–5.8)
RBC # FLD: 18.4 % — HIGH (ref 10.3–14.5)
SODIUM SERPL-SCNC: 139 MMOL/L — SIGNIFICANT CHANGE UP (ref 135–145)
WBC # BLD: 8.5 K/UL — SIGNIFICANT CHANGE UP (ref 3.8–10.5)
WBC # FLD AUTO: 8.5 K/UL — SIGNIFICANT CHANGE UP (ref 3.8–10.5)

## 2018-09-08 PROCEDURE — 99232 SBSQ HOSP IP/OBS MODERATE 35: CPT | Mod: GC

## 2018-09-08 RX ORDER — FOLIC ACID 0.8 MG
1 TABLET ORAL
Qty: 0 | Refills: 0 | COMMUNITY
Start: 2018-09-08

## 2018-09-08 RX ADMIN — Medication 20 MILLIEQUIVALENT(S): at 03:17

## 2018-09-08 RX ADMIN — Medication 0.5 MILLIGRAM(S): at 12:26

## 2018-09-08 RX ADMIN — Medication 1 MILLIGRAM(S): at 12:26

## 2018-09-08 RX ADMIN — Medication 1 MILLIGRAM(S): at 14:30

## 2018-09-08 RX ADMIN — Medication 1 MILLIGRAM(S): at 22:48

## 2018-09-08 NOTE — DISCHARGE NOTE ADULT - CARE PLAN
Principal Discharge DX:	Anemia  Goal:	Hb > 13.5  Secondary Diagnosis:	Alcoholic  Secondary Diagnosis:	HTN (hypertension)  Secondary Diagnosis:	Hypoalbuminemia  Secondary Diagnosis:	Hypokalemia Principal Discharge DX:	Symptomatic anemia  Goal:	Hg 8-9  Assessment and plan of treatment:	YOu have received IV Iron therapy and 1 unit prbc. Fecal occult blood test negative. Your hemoglobin is 8.9 today. Follow up with your doctor in 1 week.  Secondary Diagnosis:	Alcoholic  Goal:	no alcohol  Assessment and plan of treatment:	You were admitted with blood alcohol level 246 and placed on CIWA protocol with ativan taper. You are not withdrawing anymore. You should abstain from alcohol. Continue folic acid and thiamine supplements.  Secondary Diagnosis:	HTN (hypertension)  Goal:	less than 140/80  Assessment and plan of treatment:	continue amlodipine as prescribed. Follow up with PMD  Secondary Diagnosis:	Hypoalbuminemia  Assessment and plan of treatment:	You have malnutrition and low protein level due to poor nutrition and alcohol use.  Secondary Diagnosis:	Liver cirrhosis, alcoholic  Goal:	prevent complications  Assessment and plan of treatment:	You have liver cirrhosis with small ascites (per ultrasound that was done here). We recommend that you see hepatologist or gastroenterologist for follow up.

## 2018-09-08 NOTE — DISCHARGE NOTE ADULT - MEDICATION SUMMARY - MEDICATIONS TO TAKE
I will START or STAY ON the medications listed below when I get home from the hospital:    clonazePAM 0.5 mg oral tablet  -- 1 tab(s) by mouth 2 times a day  -- Indication: For ETOH abuse    PARoxetine  -- 10 milligram(s) by mouth once a day  -- Indication: For depression    amLODIPine  -- 5 milligram(s) by mouth once a day  -- Indication: For HTN (hypertension)    ergocalciferol 2000 intl units oral capsule  -- 1 cap(s) by mouth once a day  -- Indication: For vitamin D deficiency    folic acid 1 mg oral tablet  -- 1 tab(s) by mouth once a day  -- Indication: For ETOH abuse, supplements    thiamine 100 mg oral tablet  -- 1 tab(s) by mouth once a day  -- Indication: For ETOH abuse, supplements I will START or STAY ON the medications listed below when I get home from the hospital:    clonazePAM 0.5 mg oral tablet  -- 1 tab(s) by mouth 2 times a day  -- Indication: For ETOH abuse    PARoxetine  -- 10 milligram(s) by mouth once a day  -- Indication: For depression    amLODIPine  -- 5 milligram(s) by mouth once a day  -- Indication: For HTN (hypertension)    nicotine 21 mg/24 hr transdermal film, extended release  -- 21 milligram(s) by transdermal patch once a day   -- Indication: For Active smoker    ergocalciferol 2000 intl units oral capsule  -- 1 cap(s) by mouth once a day  -- Indication: For vitamin D deficiency    folic acid 1 mg oral tablet  -- 1 tab(s) by mouth once a day  -- Indication: For ETOH abuse, supplements    thiamine 100 mg oral tablet  -- 1 tab(s) by mouth once a day  -- Indication: For ETOH abuse, supplements

## 2018-09-08 NOTE — ED ADULT NURSE REASSESSMENT NOTE - GENERAL PATIENT STATE
patient getting anxious, getting up and ambulating/pacing frequently
comfortable appearance
comfortable appearance
cooperative/comfortable appearance
anxious

## 2018-09-08 NOTE — DISCHARGE NOTE ADULT - PATIENT PORTAL LINK FT
You can access the [facility] Patient Portal, offered by [facility], by registering with the following website: [email or web address]

## 2018-09-08 NOTE — DISCHARGE NOTE ADULT - PROVIDER TOKENS
FREE:[LAST:[tran],FIRST:[vasile],PHONE:[phone]],FAX:[(   [phone]    -],ADDRESS:[[id number] [place] [address] [address], [place] [address]]]

## 2018-09-08 NOTE — DISCHARGE NOTE ADULT - CARE PROVIDER_API CALL
vasile mayfield  [address], [place] [address]  Phone: [phone]  Fax: (   )    -

## 2018-09-08 NOTE — DISCHARGE NOTE ADULT - HOSPITAL COURSE
58 male with PMH of HTN , Anxiety, was sent in by PCP for low Hb. Patient went to some hospital , was found to have low hb and discharged without any intervention. Patient then went to PCP's office and found to have level of 6.2 and was sent in here.   Pt is somnolent and clinically intoxicated and unable to provide any history.    In Ed , BP : 110/66 mm hg , Hr : 76 , Temp : 97.6 F  cbc shows hb of 6.2 with normal MCV , low MCH/ MCHC and elevated RDW  bmp shows K of 2.6 ; EKG did not reveal any changes       Patient admitted to telemetry for Symptomatic Anemia , alcohol intoxication and Electrolyte abnormalities.       For Symptomatic anemia, patient came in with low H/H , no obvious signs of Gi /  bleed in history or physical, Occult negative, Likely a combination of DAYTON form poor po intake and Anemia related to Alcoholism, Start iv protonix for possible GI bleed. We trransfuse 1 unit to keep hb >7, repeat HB; 8.3  For  ETOH abuse, on admission Intoxicated , BAL of 280, monitored on CIWA protocol, Ativan 2 q2 prn, Banana bag, Thiamine, Folic acid.    For  Hypokalemia,  Likely from poor po intake and alcoholism, Replace aggressively,   EKG wnl,Patient monitored on TELE.     For Hypoalbuminemia,  Albumin of 2.4 with elevated ALT , AST, most likely due to  poor po intake. USG done     For HTN (hypertension), Amlodipine at home 5 mg OD continued and BP monitored.    Patient wanted to leave AMA, Risks and benefits explained to the patient, Patient verbally confirmed understanding the risk and benefits of AMA.     Patient was not medically stable but left AMA. Attending is informed. 58 male with PMH of HTN , Anxiety, was sent in by PCP for low Hb. Patient went to some hospital , was found to have low hb and discharged without any intervention. Patient then went to PCP's office and found to have level of 6.2 and was sent in here.   Pt is somnolent and clinically intoxicated and unable to provide any history.    In Ed , BP : 110/66 mm hg , Hr : 76 , Temp : 97.6 F  cbc shows hb of 6.2 with normal MCV , low MCH/ MCHC and elevated RDW  bmp shows K of 2.6 ; EKG did not reveal any changes       Patient admitted to telemetry for Symptomatic Anemia , alcohol intoxication and Electrolyte abnormalities.       For Symptomatic anemia, patient came in with low H/H , no obvious signs of Gi /  bleed in history or physical, Occult negative, Likely a combination of DAYTON form poor po intake and Anemia related to Alcoholism, Start iv protonix for possible GI bleed. We trransfuse 1 unit to keep hb >7, repeat HB; 8.3  For  ETOH abuse, on admission Intoxicated , BAL of 280, monitored on CIWA protocol, Ativan 2 q2 prn, Banana bag, Thiamine, Folic acid.    For  Hypokalemia,  Likely from poor po intake and alcoholism, Replace aggressively,   EKG wnl,Patient monitored on TELE.     For Hypoalbuminemia,  Albumin of 2.4 with elevated ALT , AST, most likely due to  poor po intake. USG done     For HTN (hypertension), Amlodipine at home 5 mg OD continued and BP monitored.    Electrolytes repleted, hypokalemia/hypomangesemia. Patient counseled on abstaining from alcohol. FAmily present.     1. Anemia- combination dayton vs poor nutrition  2. ETOH abuse with withdrawal- ciwa  3. Protein calorie malnutrition- 2/2 poor nutrition  4. Liver cirrhosis presumed due to ETOH  5. Hypoalbuminemia- 2/2 poor nutrition  6. Hypokalemia- repleted  7. Ascites- mild    DC planning home with PMD follow up. 58 male with PMH of HTN , Anxiety, was sent in by PCP for low Hb. Patient went to some hospital , was found to have low hb and discharged without any intervention. Patient then went to PCP's office and found to have level of 6.2 and was sent in here.   Pt is somnolent and clinically intoxicated and unable to provide any history.    In Ed , BP : 110/66 mm hg , Hr : 76 , Temp : 97.6 F  cbc shows hb of 6.2 with normal MCV , low MCH/ MCHC and elevated RDW  bmp shows K of 2.6 ; EKG did not reveal any changes       Patient admitted to telemetry for Symptomatic Anemia , alcohol intoxication and Electrolyte abnormalities.       For Symptomatic anemia, patient came in with low H/H , no obvious signs of Gi /  bleed in history or physical, Occult negative, Likely a combination of DAYTON form poor po intake and Anemia related to Alcoholism, Start iv protonix for possible GI bleed. We trransfuse 1 unit to keep hb >7, repeat HB; 8.3  For  ETOH abuse, on admission Intoxicated , BAL of 280, monitored on CIWA protocol, Ativan 2 q2 prn, Banana bag, Thiamine, Folic acid.    For  Hypokalemia,  Likely from poor po intake and alcoholism, Replace aggressively,   EKG wnl,Patient monitored on TELE.     For Hypoalbuminemia,  Albumin of 2.4 with elevated ALT , AST, most likely due to  poor po intake. USG done     For HTN (hypertension), Amlodipine at home 5 mg OD continued and BP monitored.    Electrolytes repleted, hypokalemia/hypomangesemia. Patient counseled on abstaining from alcohol. FAmily present.     1. Anemia- combination dayton vs poor nutrition  2. ETOH abuse with withdrawal- ciwa  3. Protein calorie malnutrition- 2/2 poor nutrition  4. Liver cirrhosis presumed due to ETOH  5. Hypoalbuminemia- due to liver disease and poor nutrition  6. Hypokalemia- repleted  7. Ascites- mild    DC planning home with PMD follow up.     ATTENDING ADDENDUM:  Patient was hospitalized for anemia and found to be in alcohol withdrawal.   He was treated and slowly improved.  He developed electrolyte derangement likely related to his alcoholism and poor nutritional status for which he also received treatment.  He was found to have ETOH related liver cirrhosis with mild ascites.  He was discharged in stable medical condition and advised to follow up with his PCP.

## 2018-09-08 NOTE — PROGRESS NOTE ADULT - SUBJECTIVE AND OBJECTIVE BOX
MEDICAL ATTENDING NOTE    Patient is a 58y old  Male who presents with a chief complaint of low hb (08 Sep 2018 11:49)      INTERVAL HPI/OVERNIGHT EVENTS: no new complaints    MEDICATIONS  (STANDING):  ergocalciferol 62250 Unit(s) Oral <User Schedule>  folic acid 1 milliGRAM(s) Oral daily  lactated ringers 1000 milliLiter(s) (100 mL/Hr) IV Continuous <Continuous>  multivitamin/thiamine/folic acid in sodium chloride 0.9% 1000 milliLiter(s) (80 mL/Hr) IV Continuous <Continuous>  pantoprazole    Tablet 40 milliGRAM(s) Oral before breakfast  thiamine Injectable 100 milliGRAM(s) IntraMuscular daily    MEDICATIONS  (PRN):  LORazepam   Injectable 1 milliGRAM(s) IV Push every 6 hours PRN Agitation      __________________________________________________  ----------------------------------------------------------------------------------  REVIEW OF SYSTEMS: no fever, no SOB, No Chest pain; feels well      Vital Signs Last 24 Hrs  T(C): 36.8 (08 Sep 2018 11:16), Max: 37.4 (08 Sep 2018 05:30)  T(F): 98.3 (08 Sep 2018 11:16), Max: 99.3 (08 Sep 2018 05:30)  HR: 87 (08 Sep 2018 11:16) (85 - 95)  BP: 138/74 (08 Sep 2018 11:16) (138/74 - 151/78)  BP(mean): --  RR: 18 (08 Sep 2018 11:16) (16 - 18)  SpO2: 99% (08 Sep 2018 11:16) (97% - 100%)    _________________  PHYSICAL EXAM:  ---------------------------   NAD; Normocephalic;   LUNGS - no wheezing  HEART: S1 S2+   ABDOMEN: Soft, Nontender, non distended; BS+  EXTREMITIES: no cyanosis; no edema  NERVOUS SYSTEM:  Awake and alert; no new deficits    _________________________________________________  LABS:                        8.7    8.5   )-----------( 242      ( 08 Sep 2018 13:24 )             30.0     09-08    139  |  103  |  6<L>  ----------------------------<  110<H>  3.2<L>   |  27  |  0.65    Ca    8.0<L>      08 Sep 2018 13:24  Phos  3.8     09-07  Mg     1.9     09-07    TPro  7.2  /  Alb  2.4<L>  /  TBili  1.1  /  DBili  x   /  AST  83<H>  /  ALT  39  /  AlkPhos  159<H>  09-07    PT/INR - ( 07 Sep 2018 02:06 )   PT: 14.4 sec;   INR: 1.31 ratio         PTT - ( 07 Sep 2018 02:06 )  PTT:34.8 sec    CAPILLARY BLOOD GLUCOSE                Care Discussed with Consultants :     Plan of care was discussed with patient ; all questions and concerns were addressed and care was aligned with patient's wishes.  .

## 2018-09-08 NOTE — DISCHARGE NOTE ADULT - PLAN OF CARE
Hb > 13.5 YOu have received IV Iron therapy and 1 unit prbc. Fecal occult blood test negative. Your hemoglobin is 8.9 today. Follow up with your doctor in 1 week. no alcohol You were admitted with blood alcohol level 246 and placed on CIWA protocol with ativan taper. You are not withdrawing anymore. You should abstain from alcohol. Continue folic acid and thiamine supplements. less than 140/80 continue amlodipine as prescribed. Follow up with PMD You have malnutrition and low protein level due to poor nutrition and alcohol use. prevent complications You have liver cirrhosis with small ascites (per ultrasound that was done here). We recommend that you see hepatologist or gastroenterologist for follow up. Hg 8-9 You have received IV Iron therapy and 1 unit prbc. Fecal occult blood test negative. Your hemoglobin is 8.9 today. Follow up with your doctor in 1 week.

## 2018-09-09 LAB
ANION GAP SERPL CALC-SCNC: 8 MMOL/L — SIGNIFICANT CHANGE UP (ref 5–17)
BUN SERPL-MCNC: 7 MG/DL — SIGNIFICANT CHANGE UP (ref 7–18)
CALCIUM SERPL-MCNC: 8 MG/DL — LOW (ref 8.4–10.5)
CHLORIDE SERPL-SCNC: 103 MMOL/L — SIGNIFICANT CHANGE UP (ref 96–108)
CO2 SERPL-SCNC: 28 MMOL/L — SIGNIFICANT CHANGE UP (ref 22–31)
CREAT SERPL-MCNC: 0.58 MG/DL — SIGNIFICANT CHANGE UP (ref 0.5–1.3)
GLUCOSE SERPL-MCNC: 90 MG/DL — SIGNIFICANT CHANGE UP (ref 70–99)
HCT VFR BLD CALC: 28.1 % — LOW (ref 39–50)
HGB BLD-MCNC: 8.1 G/DL — LOW (ref 13–17)
MCHC RBC-ENTMCNC: 24.6 PG — LOW (ref 27–34)
MCHC RBC-ENTMCNC: 28.7 GM/DL — LOW (ref 32–36)
MCV RBC AUTO: 85.7 FL — SIGNIFICANT CHANGE UP (ref 80–100)
PLATELET # BLD AUTO: 236 K/UL — SIGNIFICANT CHANGE UP (ref 150–400)
POTASSIUM SERPL-MCNC: 2.7 MMOL/L — CRITICAL LOW (ref 3.5–5.3)
POTASSIUM SERPL-SCNC: 2.7 MMOL/L — CRITICAL LOW (ref 3.5–5.3)
RBC # BLD: 3.28 M/UL — LOW (ref 4.2–5.8)
RBC # FLD: 18.8 % — HIGH (ref 10.3–14.5)
SODIUM SERPL-SCNC: 139 MMOL/L — SIGNIFICANT CHANGE UP (ref 135–145)
WBC # BLD: 8.4 K/UL — SIGNIFICANT CHANGE UP (ref 3.8–10.5)
WBC # FLD AUTO: 8.4 K/UL — SIGNIFICANT CHANGE UP (ref 3.8–10.5)

## 2018-09-09 PROCEDURE — 99232 SBSQ HOSP IP/OBS MODERATE 35: CPT | Mod: GC

## 2018-09-09 RX ORDER — IRON SUCROSE 20 MG/ML
200 INJECTION, SOLUTION INTRAVENOUS ONCE
Qty: 0 | Refills: 0 | Status: COMPLETED | OUTPATIENT
Start: 2018-09-09 | End: 2018-09-09

## 2018-09-09 RX ORDER — POTASSIUM CHLORIDE 20 MEQ
20 PACKET (EA) ORAL
Qty: 0 | Refills: 0 | Status: COMPLETED | OUTPATIENT
Start: 2018-09-09 | End: 2018-09-09

## 2018-09-09 RX ORDER — POTASSIUM CHLORIDE 20 MEQ
10 PACKET (EA) ORAL
Qty: 0 | Refills: 0 | Status: COMPLETED | OUTPATIENT
Start: 2018-09-09 | End: 2018-09-09

## 2018-09-09 RX ORDER — NICOTINE POLACRILEX 2 MG
2 GUM BUCCAL ONCE
Qty: 0 | Refills: 0 | Status: COMPLETED | OUTPATIENT
Start: 2018-09-09 | End: 2018-09-09

## 2018-09-09 RX ADMIN — Medication 1 MILLIGRAM(S): at 21:36

## 2018-09-09 RX ADMIN — Medication 20 MILLIEQUIVALENT(S): at 18:24

## 2018-09-09 RX ADMIN — Medication 100 MILLIGRAM(S): at 12:59

## 2018-09-09 RX ADMIN — Medication 1 MILLIGRAM(S): at 12:59

## 2018-09-09 RX ADMIN — Medication 20 MILLIEQUIVALENT(S): at 15:43

## 2018-09-09 RX ADMIN — Medication 2 MILLIGRAM(S): at 15:45

## 2018-09-09 RX ADMIN — PANTOPRAZOLE SODIUM 40 MILLIGRAM(S): 20 TABLET, DELAYED RELEASE ORAL at 10:56

## 2018-09-09 RX ADMIN — SODIUM CHLORIDE 80 MILLILITER(S): 9 INJECTION, SOLUTION INTRAVENOUS at 06:12

## 2018-09-09 RX ADMIN — IRON SUCROSE 110 MILLIGRAM(S): 20 INJECTION, SOLUTION INTRAVENOUS at 22:40

## 2018-09-09 RX ADMIN — Medication 100 MILLIEQUIVALENT(S): at 12:59

## 2018-09-09 RX ADMIN — Medication 20 MILLIEQUIVALENT(S): at 12:59

## 2018-09-09 RX ADMIN — Medication 1 MILLIGRAM(S): at 13:00

## 2018-09-09 RX ADMIN — Medication 100 MILLIEQUIVALENT(S): at 15:44

## 2018-09-09 NOTE — DIETITIAN INITIAL EVALUATION ADULT. - NUTRITION INTERVENTION
Feeding Assistance/Medical Food Supplements/Vitamin/Meals and Snack/Collaboration and Referral of Nutrition Care

## 2018-09-09 NOTE — PROGRESS NOTE ADULT - SUBJECTIVE AND OBJECTIVE BOX
MEDICAL ATTENDING NOTE    Patient is a 58y old  Male who presents with a chief complaint of low hb (08 Sep 2018 14:51)      INTERVAL HPI/OVERNIGHT EVENTS: no new complaints    MEDICATIONS  (STANDING):  ergocalciferol 99102 Unit(s) Oral <User Schedule>  folic acid 1 milliGRAM(s) Oral daily  iron sucrose IVPB 200 milliGRAM(s) IV Intermittent once  lactated ringers 1000 milliLiter(s) (100 mL/Hr) IV Continuous <Continuous>  multivitamin/thiamine/folic acid in sodium chloride 0.9% 1000 milliLiter(s) (80 mL/Hr) IV Continuous <Continuous>  pantoprazole    Tablet 40 milliGRAM(s) Oral before breakfast  potassium chloride    Tablet ER 20 milliEquivalent(s) Oral every 2 hours  potassium chloride  10 mEq/100 mL IVPB 10 milliEquivalent(s) IV Intermittent every 1 hour    MEDICATIONS  (PRN):  LORazepam   Injectable 1 milliGRAM(s) IV Push every 6 hours PRN Agitation  nicotine  Polacrilex Gum 2 milliGRAM(s) Oral once PRN smoking cessation      __________________________________________________  ----------------------------------------------------------------------------------  REVIEW OF SYSTEMS: no fever, no SOB, No Chest pain; feels well      Vital Signs Last 24 Hrs  T(C): 37.4 (09 Sep 2018 07:12), Max: 37.7 (08 Sep 2018 16:48)  T(F): 99.4 (09 Sep 2018 07:12), Max: 99.9 (08 Sep 2018 16:48)  HR: 87 (09 Sep 2018 07:12) (86 - 89)  BP: 146/78 (09 Sep 2018 07:12) (137/77 - 159/94)  BP(mean): --  RR: 18 (09 Sep 2018 07:12) (16 - 20)  SpO2: 98% (09 Sep 2018 07:12) (98% - 100%)    _________________  PHYSICAL EXAM:  ---------------------------   NAD; Normocephalic;   LUNGS - no wheezing  HEART: S1 S2+   ABDOMEN: Soft, Nontender, non distended  EXTREMITIES: no cyanosis; no edema  NERVOUS SYSTEM:  Awake and alert; no new deficits    _________________________________________________  LABS:                        8.1    8.4   )-----------( 236      ( 09 Sep 2018 06:19 )             28.1     09-09    139  |  103  |  7   ----------------------------<  90  2.7<LL>   |  28  |  0.58    Ca    8.0<L>      09 Sep 2018 06:19          CAPILLARY BLOOD GLUCOSE                Care Discussed with Consultants :     Plan of care was discussed with patient ; all questions and concerns were addressed and care was aligned with patient's wishes.

## 2018-09-09 NOTE — CHART NOTE - NSCHARTNOTEFT_GEN_A_CORE
Upon Nutritional Assessment by the Registered Dietitian your patient was determined to meet criteria / has evidence of the following diagnosis/diagnoses:          [ ]  Mild Protein Calorie Malnutrition        [ X ]  Moderate Protein Calorie Malnutrition        [ ] Severe Protein Calorie Malnutrition        [ ] Unspecified Protein Calorie Malnutrition        [ ] Underweight / BMI <19        [ ] Morbid Obesity / BMI > 40      Findings as based on:  •  Comprehensive nutrition assessment and consultation  •  Calorie counts (nutrient intake analysis)  •  Food acceptance and intake status from observations by staff  •  Follow up  •  Patient education  •  Intervention secondary to interdisciplinary rounds  •   concerns      Treatment:    The following diet has been recommended: Ensure Enlive  1can ( 240ml ) x bid ( 700 kcal, 40 g protein) when diet advanced as medically feasible       PROVIDER Section:     By signing this assessment you are acknowledging and agree with the diagnosis/diagnoses assigned by the Registered Dietitian    Comments:

## 2018-09-09 NOTE — DIETITIAN INITIAL EVALUATION ADULT. - OTHER INFO
nutrition consult requested for assessment, kcal count, education; lives home with family; skin intact; denied GI distress, chewing or swallowing problem at present, no specific food choices reported; Protein-Kcal Malnutrition per MD, ascites +liana nutrition consult requested for assessment, kcal count, education; lives home with family; skin intact; denied GI distress, chewing or swallowing problem at present, no specific food choices reported; Protein-Kcal Malnutrition per MD, ascites +liana;  consult noted

## 2018-09-09 NOTE — ED ADULT NURSE REASSESSMENT NOTE - NS ED NURSE REASSESS COMMENT FT1
Pt reassessed, observed sleeping, in no distress. Meds administered as ordered. Pt waiting to be picked up by brother this A.M. Nursing monitoring continues.
Received pt from ANGELA Friedman, pt observed laying in bed, breathing room air, in no distress at time of assessment. Pt is A&O x3, able to make needs known, ambulatory on own, skin intact, right forearm and left AC #20Ga in place. Meds administered as ordered, pt tolerated well. CIWA score 0. Pt admitted to [facility], had a bed, but pt refused to go to room. Pt states he wish to leave and brother is coming to pick him up. Dr. Manley, Dr. Sanchez and Dr. Stewart made aware. I spoke with pt's brother over the phone, Ángel Andrea earlier during the shift, he says he will come in the A.M to pick pt up upon discharge. ANGELA Owens made aware. Nursing monitoring continues.
No c/o offered. Had sonogram. Spoke with attending and agreeable to stay.
Pt post 1 unit PRBC. No reaction noted. CIWA 0
Pt wants to go home. Seen by SW, Sister spoke with SW,

## 2018-09-09 NOTE — DIETITIAN INITIAL EVALUATION ADULT. - MD RECOMMEND
order a diet as medically feasible: regular low Na, Ensure Enlive  1can ( 240ml ) x bid ( 700 kcal, 40 g protein)

## 2018-09-10 VITALS
RESPIRATION RATE: 18 BRPM | TEMPERATURE: 99 F | HEART RATE: 93 BPM | OXYGEN SATURATION: 98 % | DIASTOLIC BLOOD PRESSURE: 72 MMHG | SYSTOLIC BLOOD PRESSURE: 133 MMHG

## 2018-09-10 LAB
ANION GAP SERPL CALC-SCNC: 10 MMOL/L — SIGNIFICANT CHANGE UP (ref 5–17)
BUN SERPL-MCNC: 7 MG/DL — SIGNIFICANT CHANGE UP (ref 7–18)
CALCIUM SERPL-MCNC: 8.2 MG/DL — LOW (ref 8.4–10.5)
CHLORIDE SERPL-SCNC: 107 MMOL/L — SIGNIFICANT CHANGE UP (ref 96–108)
CO2 SERPL-SCNC: 24 MMOL/L — SIGNIFICANT CHANGE UP (ref 22–31)
CREAT SERPL-MCNC: 0.61 MG/DL — SIGNIFICANT CHANGE UP (ref 0.5–1.3)
GLUCOSE SERPL-MCNC: 99 MG/DL — SIGNIFICANT CHANGE UP (ref 70–99)
HCT VFR BLD CALC: 30.3 % — LOW (ref 39–50)
HGB BLD-MCNC: 8.9 G/DL — LOW (ref 13–17)
MAGNESIUM SERPL-MCNC: 1.6 MG/DL — SIGNIFICANT CHANGE UP (ref 1.6–2.6)
MCHC RBC-ENTMCNC: 25.4 PG — LOW (ref 27–34)
MCHC RBC-ENTMCNC: 29.2 GM/DL — LOW (ref 32–36)
MCV RBC AUTO: 86.9 FL — SIGNIFICANT CHANGE UP (ref 80–100)
PHOSPHATE SERPL-MCNC: 3.4 MG/DL — SIGNIFICANT CHANGE UP (ref 2.5–4.5)
PLATELET # BLD AUTO: 258 K/UL — SIGNIFICANT CHANGE UP (ref 150–400)
POTASSIUM SERPL-MCNC: 3.2 MMOL/L — LOW (ref 3.5–5.3)
POTASSIUM SERPL-SCNC: 3.2 MMOL/L — LOW (ref 3.5–5.3)
RBC # BLD: 3.49 M/UL — LOW (ref 4.2–5.8)
RBC # FLD: 18.5 % — HIGH (ref 10.3–14.5)
SODIUM SERPL-SCNC: 141 MMOL/L — SIGNIFICANT CHANGE UP (ref 135–145)
WBC # BLD: 8.3 K/UL — SIGNIFICANT CHANGE UP (ref 3.8–10.5)
WBC # FLD AUTO: 8.3 K/UL — SIGNIFICANT CHANGE UP (ref 3.8–10.5)

## 2018-09-10 PROCEDURE — 83550 IRON BINDING TEST: CPT

## 2018-09-10 PROCEDURE — 86850 RBC ANTIBODY SCREEN: CPT

## 2018-09-10 PROCEDURE — 86900 BLOOD TYPING SEROLOGIC ABO: CPT

## 2018-09-10 PROCEDURE — 83615 LACTATE (LD) (LDH) ENZYME: CPT

## 2018-09-10 PROCEDURE — 36430 TRANSFUSION BLD/BLD COMPNT: CPT

## 2018-09-10 PROCEDURE — 82272 OCCULT BLD FECES 1-3 TESTS: CPT

## 2018-09-10 PROCEDURE — 96376 TX/PRO/DX INJ SAME DRUG ADON: CPT

## 2018-09-10 PROCEDURE — 86923 COMPATIBILITY TEST ELECTRIC: CPT

## 2018-09-10 PROCEDURE — 82962 GLUCOSE BLOOD TEST: CPT

## 2018-09-10 PROCEDURE — 83036 HEMOGLOBIN GLYCOSYLATED A1C: CPT

## 2018-09-10 PROCEDURE — 85610 PROTHROMBIN TIME: CPT

## 2018-09-10 PROCEDURE — 85730 THROMBOPLASTIN TIME PARTIAL: CPT

## 2018-09-10 PROCEDURE — 84466 ASSAY OF TRANSFERRIN: CPT

## 2018-09-10 PROCEDURE — P9040: CPT

## 2018-09-10 PROCEDURE — 80061 LIPID PANEL: CPT

## 2018-09-10 PROCEDURE — 83735 ASSAY OF MAGNESIUM: CPT

## 2018-09-10 PROCEDURE — 84100 ASSAY OF PHOSPHORUS: CPT

## 2018-09-10 PROCEDURE — 85027 COMPLETE CBC AUTOMATED: CPT

## 2018-09-10 PROCEDURE — 93005 ELECTROCARDIOGRAM TRACING: CPT

## 2018-09-10 PROCEDURE — 96375 TX/PRO/DX INJ NEW DRUG ADDON: CPT

## 2018-09-10 PROCEDURE — 76705 ECHO EXAM OF ABDOMEN: CPT

## 2018-09-10 PROCEDURE — 80053 COMPREHEN METABOLIC PANEL: CPT

## 2018-09-10 PROCEDURE — 82306 VITAMIN D 25 HYDROXY: CPT

## 2018-09-10 PROCEDURE — 99285 EMERGENCY DEPT VISIT HI MDM: CPT | Mod: 25

## 2018-09-10 PROCEDURE — 99239 HOSP IP/OBS DSCHRG MGMT >30: CPT

## 2018-09-10 PROCEDURE — 80048 BASIC METABOLIC PNL TOTAL CA: CPT

## 2018-09-10 PROCEDURE — 80307 DRUG TEST PRSMV CHEM ANLYZR: CPT

## 2018-09-10 PROCEDURE — 82728 ASSAY OF FERRITIN: CPT

## 2018-09-10 PROCEDURE — 84443 ASSAY THYROID STIM HORMONE: CPT

## 2018-09-10 PROCEDURE — 96374 THER/PROPH/DIAG INJ IV PUSH: CPT

## 2018-09-10 PROCEDURE — 86901 BLOOD TYPING SEROLOGIC RH(D): CPT

## 2018-09-10 RX ORDER — POTASSIUM CHLORIDE 20 MEQ
40 PACKET (EA) ORAL ONCE
Qty: 0 | Refills: 0 | Status: COMPLETED | OUTPATIENT
Start: 2018-09-10 | End: 2018-09-10

## 2018-09-10 RX ORDER — AMLODIPINE BESYLATE 2.5 MG/1
0 TABLET ORAL
Qty: 0 | Refills: 0 | COMMUNITY

## 2018-09-10 RX ORDER — NICOTINE POLACRILEX 2 MG
1 GUM BUCCAL DAILY
Qty: 0 | Refills: 0 | Status: DISCONTINUED | OUTPATIENT
Start: 2018-09-10 | End: 2018-09-10

## 2018-09-10 RX ORDER — FOLIC ACID 0.8 MG
1 TABLET ORAL
Qty: 30 | Refills: 0
Start: 2018-09-10 | End: 2018-10-09

## 2018-09-10 RX ORDER — ERGOCALCIFEROL 1.25 MG/1
1 CAPSULE ORAL
Qty: 6 | Refills: 0
Start: 2018-09-10

## 2018-09-10 RX ORDER — NICOTINE POLACRILEX 2 MG
21 GUM BUCCAL
Qty: 7 | Refills: 0
Start: 2018-09-10 | End: 2018-09-16

## 2018-09-10 RX ORDER — MAGNESIUM SULFATE 500 MG/ML
2 VIAL (ML) INJECTION ONCE
Qty: 0 | Refills: 0 | Status: COMPLETED | OUTPATIENT
Start: 2018-09-10 | End: 2018-09-10

## 2018-09-10 RX ORDER — CLONAZEPAM 1 MG
0 TABLET ORAL
Qty: 0 | Refills: 0 | COMMUNITY

## 2018-09-10 RX ADMIN — Medication 40 MILLIEQUIVALENT(S): at 11:51

## 2018-09-10 RX ADMIN — Medication 50 GRAM(S): at 11:50

## 2018-09-10 RX ADMIN — Medication 40 MILLIEQUIVALENT(S): at 13:24

## 2018-09-10 RX ADMIN — PANTOPRAZOLE SODIUM 40 MILLIGRAM(S): 20 TABLET, DELAYED RELEASE ORAL at 04:58

## 2018-09-10 RX ADMIN — Medication 1 MILLIGRAM(S): at 13:24

## 2018-09-10 NOTE — PROGRESS NOTE ADULT - PROBLEM SELECTOR PLAN 3
replace potassium
severe hypokalemia- likely due to metabolic derangement from alcoholism.  Replacing  potassium. check magnesium  and BMP in am
potassium replaced

## 2018-09-10 NOTE — PROGRESS NOTE ADULT - SUBJECTIVE AND OBJECTIVE BOX
MEDICAL ATTENDING NOTE    Patient is a 58y old  Male who presents with a chief complaint of low hb (09 Sep 2018 15:35)      INTERVAL HPI/OVERNIGHT EVENTS: no new complaints    MEDICATIONS  (STANDING):  ergocalciferol 83562 Unit(s) Oral <User Schedule>  folic acid 1 milliGRAM(s) Oral daily  lactated ringers 1000 milliLiter(s) (100 mL/Hr) IV Continuous <Continuous>  multivitamin/thiamine/folic acid in sodium chloride 0.9% 1000 milliLiter(s) (80 mL/Hr) IV Continuous <Continuous>  nicotine - 21 mG/24Hr(s) Patch 1 patch Transdermal daily  pantoprazole    Tablet 40 milliGRAM(s) Oral before breakfast    MEDICATIONS  (PRN):  LORazepam   Injectable 1 milliGRAM(s) IV Push every 6 hours PRN Agitation      __________________________________________________  ----------------------------------------------------------------------------------  REVIEW OF SYSTEMS: no fever, no SOB, No Chest pain; feels well      Vital Signs Last 24 Hrs  T(C): 37.3 (10 Sep 2018 11:05), Max: 37.3 (10 Sep 2018 11:05)  T(F): 99.1 (10 Sep 2018 11:05), Max: 99.1 (10 Sep 2018 11:05)  HR: 93 (10 Sep 2018 11:05) (86 - 93)  BP: 133/72 (10 Sep 2018 11:05) (133/72 - 154/89)  BP(mean): --  RR: 18 (10 Sep 2018 11:05) (17 - 18)  SpO2: 98% (10 Sep 2018 11:05) (98% - 100%)    _________________  PHYSICAL EXAM:  ---------------------------   NAD; Normocephalic;   LUNGS - no wheezing  HEART: S1 S2+   ABDOMEN: Soft, Nontender, non distended  EXTREMITIES: no cyanosis; no edema  NERVOUS SYSTEM:  Awake and alert; no new deficits    _________________________________________________  LABS:                        8.9    8.3   )-----------( 258      ( 10 Sep 2018 09:08 )             30.3     09-10    141  |  107  |  7   ----------------------------<  99  3.2<L>   |  24  |  0.61    Ca    8.2<L>      10 Sep 2018 09:08  Phos  3.4     09-10  Mg     1.6     09-10          CAPILLARY BLOOD GLUCOSE                Care Discussed with Consultants :     Plan of care was discussed with patient ; all questions and concerns were addressed and care was aligned with patient's wishes.  Patient has been advised to follow up with PMD upon discharge from the hospital.  Discharge plans discussed with nursing staff , case manger and . MEDICAL ATTENDING NOTE    Patient is a 58y old  Male who presents with a chief complaint of low hb (09 Sep 2018 15:35)      INTERVAL HPI/OVERNIGHT EVENTS: no new complaints    MEDICATIONS  (STANDING):  ergocalciferol 13096 Unit(s) Oral <User Schedule>  folic acid 1 milliGRAM(s) Oral daily  lactated ringers 1000 milliLiter(s) (100 mL/Hr) IV Continuous <Continuous>  multivitamin/thiamine/folic acid in sodium chloride 0.9% 1000 milliLiter(s) (80 mL/Hr) IV Continuous <Continuous>  nicotine - 21 mG/24Hr(s) Patch 1 patch Transdermal daily  pantoprazole    Tablet 40 milliGRAM(s) Oral before breakfast    MEDICATIONS  (PRN):  LORazepam   Injectable 1 milliGRAM(s) IV Push every 6 hours PRN Agitation      __________________________________________________  ----------------------------------------------------------------------------------  REVIEW OF SYSTEMS: no fever, no SOB, No Chest pain; feels well      Vital Signs Last 24 Hrs  T(C): 37.3 (10 Sep 2018 11:05), Max: 37.3 (10 Sep 2018 11:05)  T(F): 99.1 (10 Sep 2018 11:05), Max: 99.1 (10 Sep 2018 11:05)  HR: 93 (10 Sep 2018 11:05) (86 - 93)  BP: 133/72 (10 Sep 2018 11:05) (133/72 - 154/89)  BP(mean): --  RR: 18 (10 Sep 2018 11:05) (17 - 18)  SpO2: 98% (10 Sep 2018 11:05) (98% - 100%)    _________________  PHYSICAL EXAM:  ---------------------------   NAD; Normocephalic;   LUNGS - no wheezing  HEART: S1 S2+   ABDOMEN: Soft, Nontender, non distended; BS+  EXTREMITIES: no cyanosis; no edema  NERVOUS SYSTEM:  Awake and alert; no new deficits    _________________________________________________  LABS:                        8.9    8.3   )-----------( 258      ( 10 Sep 2018 09:08 )             30.3     09-10    141  |  107  |  7   ----------------------------<  99  3.2<L>   |  24  |  0.61    Ca    8.2<L>      10 Sep 2018 09:08  Phos  3.4     09-10  Mg     1.6     09-10          CAPILLARY BLOOD GLUCOSE                Care Discussed with Consultants :     Plan of care was discussed with patient ; all questions and concerns were addressed and care was aligned with patient's wishes.  Patient has been advised to follow up with PMD upon discharge from the hospital.  Discharge plans discussed with nursing staff , case manger and .

## 2018-09-10 NOTE — PROGRESS NOTE ADULT - PROBLEM SELECTOR PLAN 2
ETOH abuse and withdrawal-  has not required Ativan in more than 12 hours.  Continue Ativan taper  ETOH cessation counselling  Social work referral
ETOH abuse and withdrawal- improving  Continue Ativan taper  ETOH cessation counselling  Social work assessment
cessation counselling done

## 2018-09-10 NOTE — PROGRESS NOTE ADULT - PROBLEM SELECTOR PLAN 1
Stable post PRBC transfusion.  monitor
monitor cbc daily.  infuse Venofer 200mg x 1 today
hemoglobin stable

## 2018-09-10 NOTE — PROGRESS NOTE ADULT - ASSESSMENT
58 male with PMH of HTN , Anxiety, was sent in by PCP for low Hb of 6.2   Admitted for     1. Anemia  2. ETOH abuse with withdrawal  3. Protein calorie malnutrition  4. Liver cirrhosis presumed due to ETOH  5. Hypoalbuminemia  6. Hypokalemia  7. Ascites

## 2018-09-10 NOTE — PROGRESS NOTE ADULT - ATTENDING COMMENTS
Patient discharged accompanied home by his family.  He was discharged in stable medical condition and advised to follow up with his PCP within 1 week of discharge.
anticipate discharge in am if stable.

## 2018-10-08 ENCOUNTER — EMERGENCY (EMERGENCY)
Facility: HOSPITAL | Age: 58
LOS: 1 days | Discharge: HOME | End: 2018-10-08
Attending: EMERGENCY MEDICINE
Payer: COMMERCIAL

## 2018-10-08 VITALS
RESPIRATION RATE: 16 BRPM | OXYGEN SATURATION: 100 % | WEIGHT: 149.91 LBS | HEIGHT: 66 IN | HEART RATE: 99 BPM | TEMPERATURE: 98 F | DIASTOLIC BLOOD PRESSURE: 58 MMHG | SYSTOLIC BLOOD PRESSURE: 107 MMHG

## 2018-10-08 PROCEDURE — 99285 EMERGENCY DEPT VISIT HI MDM: CPT

## 2018-10-08 RX ORDER — SODIUM CHLORIDE 9 MG/ML
3 INJECTION INTRAMUSCULAR; INTRAVENOUS; SUBCUTANEOUS ONCE
Qty: 0 | Refills: 0 | Status: COMPLETED | OUTPATIENT
Start: 2018-10-08 | End: 2018-10-08

## 2018-10-08 RX ORDER — SODIUM CHLORIDE 9 MG/ML
1000 INJECTION, SOLUTION INTRAVENOUS
Qty: 0 | Refills: 0 | Status: DISCONTINUED | OUTPATIENT
Start: 2018-10-08 | End: 2018-10-12

## 2018-10-08 NOTE — ED PROVIDER NOTE - OBJECTIVE STATEMENT
58yoM with hx HTN, anemia, alcohol related cirrhosis BIB family who report patient needs detox. Patient has no acute complaints. Denies recent alcohol intake, denies pain, N/V/D/abd pain. Reports he has been unable to sleep for the last 2 years.

## 2018-10-08 NOTE — ED PROVIDER NOTE - MUSCULOSKELETAL, MLM
Spine appears normal, range of motion is not limited, no muscle or joint tenderness. has healing abrasions over R dorsum of hand and R knee/lower leg

## 2018-10-08 NOTE — ED PROVIDER NOTE - MEDICAL DECISION MAKING DETAILS
58yoM brought by family for alcohol detox. In ED patient clinically intoxicated, will obtain labs, CXR, UA, CThead. Will reassess.

## 2018-10-08 NOTE — ED PROVIDER NOTE - PROGRESS NOTE DETAILS
labs shows no acute changes, XR/CThead unremarkable, alcohol level 360s  on reeval at 5am patient arousable, now A+Ox3, patient reports he would like to go home, called his family who report they will not be able to pick him up until 9am  at 7am patient awake and alert, discussed that his family plan to pick him up at 9am. Patient willing to wait for family, given 10mg librium to prevent withdrawal symptoms while waiting in ED.

## 2018-10-08 NOTE — ED PROVIDER NOTE - CONSTITUTIONAL, MLM
normal... Well appearing, well nourished, awake, alert, oriented to person, place, but not time and in no apparent distress. Patient smells of alcohol.

## 2018-10-09 VITALS
TEMPERATURE: 98 F | DIASTOLIC BLOOD PRESSURE: 69 MMHG | SYSTOLIC BLOOD PRESSURE: 131 MMHG | OXYGEN SATURATION: 98 % | RESPIRATION RATE: 18 BRPM | HEART RATE: 88 BPM

## 2018-10-09 PROBLEM — F10.20 ALCOHOL DEPENDENCE, UNCOMPLICATED: Chronic | Status: ACTIVE | Noted: 2018-09-06

## 2018-10-09 PROBLEM — D64.9 ANEMIA, UNSPECIFIED: Chronic | Status: ACTIVE | Noted: 2018-09-06

## 2018-10-09 LAB
ALBUMIN SERPL ELPH-MCNC: 3 G/DL — LOW (ref 3.5–5)
ALP SERPL-CCNC: 132 U/L — HIGH (ref 40–120)
ALT FLD-CCNC: 33 U/L DA — SIGNIFICANT CHANGE UP (ref 10–60)
AMMONIA BLD-MCNC: 49 UMOL/L — HIGH (ref 11–32)
ANION GAP SERPL CALC-SCNC: 9 MMOL/L — SIGNIFICANT CHANGE UP (ref 5–17)
APTT BLD: 36.1 SEC — SIGNIFICANT CHANGE UP (ref 27.5–37.4)
AST SERPL-CCNC: 48 U/L — HIGH (ref 10–40)
BASOPHILS # BLD AUTO: 0.2 K/UL — SIGNIFICANT CHANGE UP (ref 0–0.2)
BASOPHILS NFR BLD AUTO: 2.5 % — HIGH (ref 0–2)
BILIRUB SERPL-MCNC: 0.4 MG/DL — SIGNIFICANT CHANGE UP (ref 0.2–1.2)
BUN SERPL-MCNC: 6 MG/DL — LOW (ref 7–18)
CALCIUM SERPL-MCNC: 8.2 MG/DL — LOW (ref 8.4–10.5)
CHLORIDE SERPL-SCNC: 110 MMOL/L — HIGH (ref 96–108)
CO2 SERPL-SCNC: 27 MMOL/L — SIGNIFICANT CHANGE UP (ref 22–31)
CREAT SERPL-MCNC: 0.74 MG/DL — SIGNIFICANT CHANGE UP (ref 0.5–1.3)
EOSINOPHIL # BLD AUTO: 0.4 K/UL — SIGNIFICANT CHANGE UP (ref 0–0.5)
EOSINOPHIL NFR BLD AUTO: 5 % — SIGNIFICANT CHANGE UP (ref 0–6)
ETHANOL SERPL-MCNC: 364 MG/DL — HIGH (ref 0–10)
GLUCOSE SERPL-MCNC: 101 MG/DL — HIGH (ref 70–99)
HCT VFR BLD CALC: 30.3 % — LOW (ref 39–50)
HGB BLD-MCNC: 9.1 G/DL — LOW (ref 13–17)
INR BLD: 1.18 RATIO — HIGH (ref 0.88–1.16)
LYMPHOCYTES # BLD AUTO: 3.3 K/UL — SIGNIFICANT CHANGE UP (ref 1–3.3)
LYMPHOCYTES # BLD AUTO: 44.2 % — HIGH (ref 13–44)
MCHC RBC-ENTMCNC: 24 PG — LOW (ref 27–34)
MCHC RBC-ENTMCNC: 29.9 GM/DL — LOW (ref 32–36)
MCV RBC AUTO: 80.5 FL — SIGNIFICANT CHANGE UP (ref 80–100)
MONOCYTES # BLD AUTO: 0.6 K/UL — SIGNIFICANT CHANGE UP (ref 0–0.9)
MONOCYTES NFR BLD AUTO: 7.5 % — SIGNIFICANT CHANGE UP (ref 2–14)
NEUTROPHILS # BLD AUTO: 3 K/UL — SIGNIFICANT CHANGE UP (ref 1.8–7.4)
NEUTROPHILS NFR BLD AUTO: 40.8 % — LOW (ref 43–77)
PLATELET # BLD AUTO: 330 K/UL — SIGNIFICANT CHANGE UP (ref 150–400)
POTASSIUM SERPL-MCNC: 3.3 MMOL/L — LOW (ref 3.5–5.3)
POTASSIUM SERPL-SCNC: 3.3 MMOL/L — LOW (ref 3.5–5.3)
PROT SERPL-MCNC: 7.7 G/DL — SIGNIFICANT CHANGE UP (ref 6–8.3)
PROTHROM AB SERPL-ACNC: 12.9 SEC — HIGH (ref 9.8–12.7)
RBC # BLD: 3.76 M/UL — LOW (ref 4.2–5.8)
RBC # FLD: 19.3 % — HIGH (ref 10.3–14.5)
SODIUM SERPL-SCNC: 146 MMOL/L — HIGH (ref 135–145)
WBC # BLD: 7.4 K/UL — SIGNIFICANT CHANGE UP (ref 3.8–10.5)
WBC # FLD AUTO: 7.4 K/UL — SIGNIFICANT CHANGE UP (ref 3.8–10.5)

## 2018-10-09 PROCEDURE — 71045 X-RAY EXAM CHEST 1 VIEW: CPT

## 2018-10-09 PROCEDURE — 85730 THROMBOPLASTIN TIME PARTIAL: CPT

## 2018-10-09 PROCEDURE — 82140 ASSAY OF AMMONIA: CPT

## 2018-10-09 PROCEDURE — 93005 ELECTROCARDIOGRAM TRACING: CPT

## 2018-10-09 PROCEDURE — 70450 CT HEAD/BRAIN W/O DYE: CPT

## 2018-10-09 PROCEDURE — 85027 COMPLETE CBC AUTOMATED: CPT

## 2018-10-09 PROCEDURE — 80307 DRUG TEST PRSMV CHEM ANLYZR: CPT

## 2018-10-09 PROCEDURE — 84484 ASSAY OF TROPONIN QUANT: CPT

## 2018-10-09 PROCEDURE — 96374 THER/PROPH/DIAG INJ IV PUSH: CPT

## 2018-10-09 PROCEDURE — 99285 EMERGENCY DEPT VISIT HI MDM: CPT | Mod: 25

## 2018-10-09 PROCEDURE — 82962 GLUCOSE BLOOD TEST: CPT

## 2018-10-09 PROCEDURE — 80053 COMPREHEN METABOLIC PANEL: CPT

## 2018-10-09 PROCEDURE — 85610 PROTHROMBIN TIME: CPT

## 2018-10-09 PROCEDURE — 70450 CT HEAD/BRAIN W/O DYE: CPT | Mod: 26

## 2018-10-09 PROCEDURE — 71045 X-RAY EXAM CHEST 1 VIEW: CPT | Mod: 26

## 2018-10-09 RX ORDER — POTASSIUM CHLORIDE 20 MEQ
40 PACKET (EA) ORAL ONCE
Qty: 0 | Refills: 0 | Status: DISCONTINUED | OUTPATIENT
Start: 2018-10-09 | End: 2018-10-12

## 2018-10-09 RX ADMIN — SODIUM CHLORIDE 150 MILLILITER(S): 9 INJECTION, SOLUTION INTRAVENOUS at 02:07

## 2018-10-09 RX ADMIN — SODIUM CHLORIDE 3 MILLILITER(S): 9 INJECTION INTRAMUSCULAR; INTRAVENOUS; SUBCUTANEOUS at 01:04

## 2018-10-09 NOTE — ED ADULT NURSE NOTE - NSIMPLEMENTINTERV_GEN_ALL_ED
Implemented All Universal Safety Interventions:  [place] to call system. Call bell, personal items and telephone within reach. Instruct patient to call for assistance. Room bathroom lighting operational. Non-slip footwear when patient is off stretcher. Physically safe environment: no spills, clutter or unnecessary equipment. Stretcher in lowest position, wheels locked, appropriate side rails in place.

## 2018-10-20 NOTE — PATIENT PROFILE ADULT. - AS SC BRADEN FRICTION
Talk with Soraya given information regarding options for alternative medication. Advised do not know what her insurance cost will be for medications below but in review of [facility] with patient the valsartan/hctz 80/12.5 mg  #30 tabs is $10.00 for members. She has a member ship. She is willing to  the prescription to take to [facility], she could also have [employer] run it first.   Unable to determine cost of prescription at [facility] for more than 30 tabs at a time.     Ok to just dispense #30 with 2 refills? She will  prescription and check site.    (3) no apparent problem no enlarged lymph nodes/no swelling of extremity/no tender lymph nodes

## 2018-11-25 ENCOUNTER — EMERGENCY (EMERGENCY)
Facility: HOSPITAL | Age: 58
LOS: 1 days | Discharge: HOME | End: 2018-11-25
Attending: EMERGENCY MEDICINE
Payer: COMMERCIAL

## 2018-11-25 VITALS
HEIGHT: 67 IN | RESPIRATION RATE: 16 BRPM | DIASTOLIC BLOOD PRESSURE: 68 MMHG | OXYGEN SATURATION: 98 % | SYSTOLIC BLOOD PRESSURE: 106 MMHG | HEART RATE: 72 BPM | TEMPERATURE: 98 F | WEIGHT: 139.99 LBS

## 2018-11-25 PROCEDURE — 70450 CT HEAD/BRAIN W/O DYE: CPT | Mod: 26

## 2018-11-25 PROCEDURE — 99284 EMERGENCY DEPT VISIT MOD MDM: CPT

## 2018-11-25 PROCEDURE — 70450 CT HEAD/BRAIN W/O DYE: CPT

## 2018-11-25 PROCEDURE — 99285 EMERGENCY DEPT VISIT HI MDM: CPT | Mod: 25

## 2018-11-25 NOTE — ED PROVIDER NOTE - PROGRESS NOTE DETAILS
Patient's son, Jacob, will come to see the patient. He denies witnessing any injury or fall but [place]. Pt improved, and CT head WNL.  Family here to help him home safely.  Advised strict return precautions and PMD f/u.

## 2018-11-25 NOTE — ED ADULT NURSE NOTE - NS ED NURSE DC INFO COMPLEXITY
Abdomen soft, nontender, nondistended, bowel sounds present in all 4 quadrants. Verbalized Understanding

## 2018-11-25 NOTE — ED PROVIDER NOTE - MEDICAL DECISION MAKING DETAILS
57 y/o M pt BIB sister for suspected alcohol intoxication. No definite injury but reportedly found on the ground. Will check CT head and reevaluate. Patient appearing to be clinically sober and with no signs of alcohol withdrawal. Will attempt to contact patient's family.

## 2018-11-25 NOTE — ED ADULT NURSE NOTE - NSIMPLEMENTINTERV_GEN_ALL_ED
Implemented All Fall Risk Interventions:  [place] to call system. Call bell, personal items and telephone within reach. Instruct patient to call for assistance. Room bathroom lighting operational. Non-slip footwear when patient is off stretcher. Physically safe environment: no spills, clutter or unnecessary equipment. Stretcher in lowest position, wheels locked, appropriate side rails in place. Provide visual cue, wrist band, yellow gown, etc. Monitor gait and stability. Monitor for mental status changes and reorient to person, place, and time. Review medications for side effects contributing to fall risk. Reinforce activity limits and safety measures with patient and family.

## 2018-11-26 VITALS
HEART RATE: 78 BPM | RESPIRATION RATE: 16 BRPM | OXYGEN SATURATION: 96 % | TEMPERATURE: 98 F | DIASTOLIC BLOOD PRESSURE: 88 MMHG | SYSTOLIC BLOOD PRESSURE: 150 MMHG

## 2018-11-26 NOTE — ED ADULT NURSE REASSESSMENT NOTE - NS ED NURSE REASSESS COMMENT FT1
Patient is in no acute distress, speaking in full sentences, alert and orientedx3, able to follow commands. Denies N/V . Patient was observed ambulating with a steady gait.

## 2018-12-02 ENCOUNTER — EMERGENCY (EMERGENCY)
Facility: HOSPITAL | Age: 58
LOS: 1 days | Discharge: HOME | End: 2018-12-02
Attending: EMERGENCY MEDICINE | Admitting: EMERGENCY MEDICINE
Payer: SELF-PAY

## 2018-12-02 VITALS
TEMPERATURE: 98 F | RESPIRATION RATE: 16 BRPM | DIASTOLIC BLOOD PRESSURE: 75 MMHG | HEART RATE: 83 BPM | SYSTOLIC BLOOD PRESSURE: 140 MMHG | OXYGEN SATURATION: 97 %

## 2018-12-02 LAB
ALBUMIN SERPL ELPH-MCNC: 3.8 G/DL — SIGNIFICANT CHANGE UP (ref 3.3–5)
ALP SERPL-CCNC: 133 U/L — HIGH (ref 40–120)
ALT FLD-CCNC: 38 U/L — SIGNIFICANT CHANGE UP (ref 4–41)
APAP SERPL-MCNC: < 15 UG/ML — LOW (ref 15–25)
AST SERPL-CCNC: 101 U/L — HIGH (ref 4–40)
BASOPHILS # BLD AUTO: 0.1 K/UL — SIGNIFICANT CHANGE UP (ref 0–0.2)
BASOPHILS NFR BLD AUTO: 1.5 % — SIGNIFICANT CHANGE UP (ref 0–2)
BILIRUB SERPL-MCNC: 0.5 MG/DL — SIGNIFICANT CHANGE UP (ref 0.2–1.2)
BUN SERPL-MCNC: 15 MG/DL — SIGNIFICANT CHANGE UP (ref 7–23)
CALCIUM SERPL-MCNC: 8.9 MG/DL — SIGNIFICANT CHANGE UP (ref 8.4–10.5)
CHLORIDE SERPL-SCNC: 98 MMOL/L — SIGNIFICANT CHANGE UP (ref 98–107)
CO2 SERPL-SCNC: 27 MMOL/L — SIGNIFICANT CHANGE UP (ref 22–31)
CREAT SERPL-MCNC: 0.78 MG/DL — SIGNIFICANT CHANGE UP (ref 0.5–1.3)
EOSINOPHIL # BLD AUTO: 0.04 K/UL — SIGNIFICANT CHANGE UP (ref 0–0.5)
EOSINOPHIL NFR BLD AUTO: 0.6 % — SIGNIFICANT CHANGE UP (ref 0–6)
ETHANOL BLD-MCNC: 431 MG/DL — HIGH
GLUCOSE SERPL-MCNC: 127 MG/DL — HIGH (ref 70–99)
HCT VFR BLD CALC: 31.6 % — LOW (ref 39–50)
HGB BLD-MCNC: 10.3 G/DL — LOW (ref 13–17)
IMM GRANULOCYTES # BLD AUTO: 0.04 # — SIGNIFICANT CHANGE UP
IMM GRANULOCYTES NFR BLD AUTO: 0.6 % — SIGNIFICANT CHANGE UP (ref 0–1.5)
LYMPHOCYTES # BLD AUTO: 2.2 K/UL — SIGNIFICANT CHANGE UP (ref 1–3.3)
LYMPHOCYTES # BLD AUTO: 33.1 % — SIGNIFICANT CHANGE UP (ref 13–44)
MAGNESIUM SERPL-MCNC: 1.9 MG/DL — SIGNIFICANT CHANGE UP (ref 1.6–2.6)
MCHC RBC-ENTMCNC: 27.1 PG — SIGNIFICANT CHANGE UP (ref 27–34)
MCHC RBC-ENTMCNC: 32.6 % — SIGNIFICANT CHANGE UP (ref 32–36)
MCV RBC AUTO: 83.2 FL — SIGNIFICANT CHANGE UP (ref 80–100)
MONOCYTES # BLD AUTO: 0.62 K/UL — SIGNIFICANT CHANGE UP (ref 0–0.9)
MONOCYTES NFR BLD AUTO: 9.3 % — SIGNIFICANT CHANGE UP (ref 2–14)
NEUTROPHILS # BLD AUTO: 3.65 K/UL — SIGNIFICANT CHANGE UP (ref 1.8–7.4)
NEUTROPHILS NFR BLD AUTO: 54.9 % — SIGNIFICANT CHANGE UP (ref 43–77)
NRBC # FLD: 0 — SIGNIFICANT CHANGE UP
PLATELET # BLD AUTO: 208 K/UL — SIGNIFICANT CHANGE UP (ref 150–400)
PMV BLD: 9.4 FL — SIGNIFICANT CHANGE UP (ref 7–13)
POTASSIUM SERPL-MCNC: 2.7 MMOL/L — CRITICAL LOW (ref 3.5–5.3)
POTASSIUM SERPL-SCNC: 2.7 MMOL/L — CRITICAL LOW (ref 3.5–5.3)
PROT SERPL-MCNC: 7.2 G/DL — SIGNIFICANT CHANGE UP (ref 6–8.3)
RBC # BLD: 3.8 M/UL — LOW (ref 4.2–5.8)
RBC # FLD: 25.1 % — HIGH (ref 10.3–14.5)
SALICYLATES SERPL-MCNC: < 5 MG/DL — LOW (ref 15–30)
SODIUM SERPL-SCNC: 145 MMOL/L — SIGNIFICANT CHANGE UP (ref 135–145)
TSH SERPL-MCNC: 2.44 UIU/ML — SIGNIFICANT CHANGE UP (ref 0.27–4.2)
WBC # BLD: 6.65 K/UL — SIGNIFICANT CHANGE UP (ref 3.8–10.5)
WBC # FLD AUTO: 6.65 K/UL — SIGNIFICANT CHANGE UP (ref 3.8–10.5)

## 2018-12-02 PROCEDURE — 99284 EMERGENCY DEPT VISIT MOD MDM: CPT

## 2018-12-02 RX ORDER — POTASSIUM CHLORIDE 20 MEQ
40 PACKET (EA) ORAL ONCE
Qty: 0 | Refills: 0 | Status: COMPLETED | OUTPATIENT
Start: 2018-12-02 | End: 2018-12-02

## 2018-12-02 RX ORDER — THIAMINE MONONITRATE (VIT B1) 100 MG
100 TABLET ORAL ONCE
Qty: 0 | Refills: 0 | Status: COMPLETED | OUTPATIENT
Start: 2018-12-02 | End: 2018-12-02

## 2018-12-02 RX ORDER — POTASSIUM CHLORIDE 20 MEQ
10 PACKET (EA) ORAL ONCE
Qty: 0 | Refills: 0 | Status: COMPLETED | OUTPATIENT
Start: 2018-12-02 | End: 2018-12-02

## 2018-12-02 RX ORDER — SODIUM CHLORIDE 9 MG/ML
1000 INJECTION INTRAMUSCULAR; INTRAVENOUS; SUBCUTANEOUS ONCE
Qty: 0 | Refills: 0 | Status: COMPLETED | OUTPATIENT
Start: 2018-12-02 | End: 2018-12-02

## 2018-12-02 RX ADMIN — SODIUM CHLORIDE 1000 MILLILITER(S): 9 INJECTION INTRAMUSCULAR; INTRAVENOUS; SUBCUTANEOUS at 23:48

## 2018-12-02 RX ADMIN — Medication 100 MILLIGRAM(S): at 22:47

## 2018-12-02 RX ADMIN — Medication 40 MILLIEQUIVALENT(S): at 23:48

## 2018-12-02 RX ADMIN — Medication 100 MILLIEQUIVALENT(S): at 23:48

## 2018-12-02 RX ADMIN — SODIUM CHLORIDE 1000 MILLILITER(S): 9 INJECTION INTRAMUSCULAR; INTRAVENOUS; SUBCUTANEOUS at 22:47

## 2018-12-02 NOTE — ED ADULT NURSE NOTE - NSIMPLEMENTINTERV_GEN_ALL_ED
Implemented All Fall with Harm Risk Interventions:  [place] to call system. Call bell, personal items and telephone within reach. Instruct patient to call for assistance. Room bathroom lighting operational. Non-slip footwear when patient is off stretcher. Physically safe environment: no spills, clutter or unnecessary equipment. Stretcher in lowest position, wheels locked, appropriate side rails in place. Provide visual cue, wrist band, yellow gown, etc. Monitor gait and stability. Monitor for mental status changes and reorient to person, place, and time. Review medications for side effects contributing to fall risk. Reinforce activity limits and safety measures with patient and family. Provide visual clues: red socks.

## 2018-12-02 NOTE — ED PROVIDER NOTE - PROGRESS NOTE DETAILS
pt sleeping no distress. will need to have re assessment for any suicidal ideation once sober.  Endorsed to Dr Franz Oscar PGY2: No tremors, follows directions, no complaints Oscar PGY2: pt reassessed, no si/hi, states he has been feeling down since his father passed away 3months ago, would be willing to try detox - family called to get pt after potassium is finished running, family willing to help pt through his intoxication. Pt clinically sober- will dose some libirum for early tremors to castaneda off full withdrawal as pt still has some time to wait for repeat bmp

## 2018-12-02 NOTE — ED ADULT TRIAGE NOTE - CHIEF COMPLAINT QUOTE
intox brought by family. States he has been going out and getting drunk every night. Family coming for help for detox.

## 2018-12-02 NOTE — ED PROVIDER NOTE - OBJECTIVE STATEMENT
[age]M brought in by family for alcohol intoxication, noted to have tried multiple detox centers and after each detox pt returns to consuming alcohol. Drinks beer, last drink sometime prior to arrival, family always finds him in the park. Pt without acute complaints, feels well and denies any pain or trauma.

## 2018-12-02 NOTE — ED PROVIDER NOTE - PLAN OF CARE
You were seen today for alcohol intoxication. It is very important to get detox- alcohol will cause worsening liver, heart and kidney disease.     1) Please follow-up with your primary care doctor within the next 3 days.  Please call today or tomorrow for an appointment.  If you cannot follow-up with your doctor(s), please return to the ED for any urgent issues.  2) If you have any worsening of symptoms or any other concerns please return to the ED immediately.  3) Please continue taking your home medications as directed.  4) You may have been given a copy of your labs and/or imaging.  Please go over these with your primary care doctor. You were seen today for alcohol intoxication. It is very important to get detox- alcohol will cause worsening liver, heart and kidney disease.   For the closest alcohol detox center: ask for the detox list- One option below:  Get information about Outpatient Substance Abuse Programs at [facility] and [facility], call [phone].    1) Please follow-up with your primary care doctor within the next 3 days.  Please call today or tomorrow for an appointment.  If you cannot follow-up with your doctor(s), please return to the ED for any urgent issues.  2) If you have any worsening of symptoms or any other concerns please return to the ED immediately.  3) Please continue taking your home medications as directed.  4) You may have been given a copy of your labs and/or imaging.  Please go over these with your primary care doctor.

## 2018-12-02 NOTE — ED ADULT NURSE NOTE - OBJECTIVE STATEMENT
BIBEMS accompanied by family members who state that the patient is brought to [facility] every other day for ETOH abuse. Denies withdrawal seizures, injury due to drinking, hitting his head or LOC. PERRLA. Brother and sister at bedside requesting help for detox, Attending at bedside explaining about process and patients ability to make his own medical decisions. A/Ox4 at present. No signs of injury or trauma. Lost his father 3 months PTA however this issue has been chronic over a number of years. Family states that he has chronic back pain. Denies CP/SOB, pain, N/V/D. Admits to drinking today. Safety maintained, needs attended, will continue to monitor. BIBEMS accompanied by family members who state that the patient is brought to [facility] every other day for ETOH abuse. Denies withdrawal seizures, injury due to drinking, hitting his head or LOC. PERRLA. Brother and sister at bedside requesting help for detox, Attending at bedside explaining about process and patients ability to make his own medical decisions. A/Ox4 at present. No signs of injury or trauma. Lost his father 3 months PTA however this issue has been chronic over a number of years. Family states that he has chronic back pain. Denies CP/SOB, pain, N/V/D. Admits to drinking today. Safety maintained, needs attended, will continue to monitor.  Received report from PM RN. Pt is alert and oriented times three and comfortable. Pt is waiting for pickup by his son for discharge safely home,     ANGELA Gamez

## 2018-12-02 NOTE — ED PROVIDER NOTE - NSFOLLOWUPINSTRUCTIONS_ED_ALL_ED_FT
Please seek care if you have new chest pain, shortness of breath, fevers, or worsening of symptoms that brought you to the emergency room today.  Please follow up with your primary care physician in 1-2 days or as soon as possible.    Please seek medical care if you have symptoms of alcohol withdrawal such as palpitations, vomiting, nausea, fever, sweatiness, or shakiness.    Please see the attached list for places to go for rehabilitation if you want help in quitting drinking alcohol.

## 2018-12-02 NOTE — ED PROVIDER NOTE - ATTENDING CONTRIBUTION TO CARE
Attending Statement: I have personally seen and examined this patient. I have fully participated in the care of this patient. I have reviewed all pertinent clinical information, including history physical exam, plan and the Resident's note and agree except as noted  59yo M hx HTN, anemia, chronic ETOH abuse, with family co Attending Statement: I have personally seen and examined this patient. I have fully participated in the care of this patient. I have reviewed all pertinent clinical information, including history physical exam, plan and the Resident's note and agree except as noted  59yo M hx HTN, anemia, chronic ETOH abuse, with family. Family states he drinks daily, lives with them go drinking and will be out all day. "found on the street and taken to different hospitals" Today he was on the street "Sleeping, intoxicated. Sister found him, brought him to ED for evaluation. Pt states he has been drinking, no complaints. Denies cp/sob/abdominal pain. no nausea or vomit. no fall or trauma. no head injury. PT denies any SI or HI to me, however, expressed suicidal ideation without a plan to Dr Moore. no prior attempt as per family.  Vital signs noted. laying in bed, no distress. poor dentition. mmm. non icteric. supple neck. no lac or abrasion to face/head. supple neck. normal S1-S2 No resp distress. able to speak in full and clear sentences. no wheeze, rales or stridor. soft nontender abdomen. no  rebound. no guarding. no sign of trauma. no CVAT moves all ext. Ao3 no focal deficits.  plan labs, ekg, re assess is suicidal once ETOH level results.

## 2018-12-02 NOTE — ED PROVIDER NOTE - NS ED ROS FT
CONST: no fevers, no chills  EYES: no pain  ENT: no sore throat, no cough  CV: no chest pain, no palpitations  RESP: no shortness of breath  ABD: no abdominal pain, no nausea, no vomiting  : no dysuria, increased frequency, or hematuria  MSK: no back pain  NEURO: no headache or additional neurologic complaints  HEME: no easy bleeding  SKIN:  no rash

## 2018-12-02 NOTE — ED PROVIDER NOTE - PHYSICAL EXAMINATION
Gen: AAOx3, NAD   Head: NCAT  ENT: Airway patent, moist mucous membranes, uvula midline, no cervical lymphadenopathy, b/l eyes PERRLA   Cardiac: Normal rate, normal rhythm, no murmurs/rubs/gallops appreciated  Respiratory: Lungs CTA B/L  Gastrointestinal: +BS, Abdomen soft, nontender, nondistended, no rebound, no guarding  MSK: No gross abnormalities, FROM of all four extremities, no edema  HEME: Extremities warm, pulses intact and symmetrical in all four extremities  Skin: No rashes, no lesions  Neuro: No gross neurologic deficits, CN II-XII intact, no facial asymmetry, mild extremity tremors, strength equal in all four extremities, no gait abnormality

## 2018-12-02 NOTE — ED PROVIDER NOTE - CARE PLAN
Assessment and plan of treatment:	You were seen today for alcohol intoxication. It is very important to get detox- alcohol will cause worsening liver, heart and kidney disease.     1) Please follow-up with your primary care doctor within the next 3 days.  Please call today or tomorrow for an appointment.  If you cannot follow-up with your doctor(s), please return to the ED for any urgent issues.  2) If you have any worsening of symptoms or any other concerns please return to the ED immediately.  3) Please continue taking your home medications as directed.  4) You may have been given a copy of your labs and/or imaging.  Please go over these with your primary care doctor. Assessment and plan of treatment:	You were seen today for alcohol intoxication. It is very important to get detox- alcohol will cause worsening liver, heart and kidney disease.   For the closest alcohol detox center: ask for the detox list- One option below:  Get information about Outpatient Substance Abuse Programs at [facility] and [facility], call [phone].    1) Please follow-up with your primary care doctor within the next 3 days.  Please call today or tomorrow for an appointment.  If you cannot follow-up with your doctor(s), please return to the ED for any urgent issues.  2) If you have any worsening of symptoms or any other concerns please return to the ED immediately.  3) Please continue taking your home medications as directed.  4) You may have been given a copy of your labs and/or imaging.  Please go over these with your primary care doctor. Principal Discharge DX:	Alcoholic intoxication without complication  Assessment and plan of treatment:	You were seen today for alcohol intoxication. It is very important to get detox- alcohol will cause worsening liver, heart and kidney disease.   For the closest alcohol detox center: ask for the detox list- One option below:  Get information about Outpatient Substance Abuse Programs at [facility] and [facility], call [phone].    1) Please follow-up with your primary care doctor within the next 3 days.  Please call today or tomorrow for an appointment.  If you cannot follow-up with your doctor(s), please return to the ED for any urgent issues.  2) If you have any worsening of symptoms or any other concerns please return to the ED immediately.  3) Please continue taking your home medications as directed.  4) You may have been given a copy of your labs and/or imaging.  Please go over these with your primary care doctor.

## 2018-12-02 NOTE — ED PROVIDER NOTE - MEDICAL DECISION MAKING DETAILS
58M intoxicated, family seeking detox but pt without any intent to quit alcohol, will conduct screening labs and await sobriety,

## 2018-12-03 VITALS
RESPIRATION RATE: 16 BRPM | OXYGEN SATURATION: 100 % | SYSTOLIC BLOOD PRESSURE: 161 MMHG | HEART RATE: 80 BPM | DIASTOLIC BLOOD PRESSURE: 77 MMHG | TEMPERATURE: 99 F

## 2018-12-03 LAB
BUN SERPL-MCNC: 11 MG/DL — SIGNIFICANT CHANGE UP (ref 7–23)
BUN SERPL-MCNC: 12 MG/DL — SIGNIFICANT CHANGE UP (ref 7–23)
CALCIUM SERPL-MCNC: 7.8 MG/DL — LOW (ref 8.4–10.5)
CALCIUM SERPL-MCNC: 8 MG/DL — LOW (ref 8.4–10.5)
CHLORIDE SERPL-SCNC: 103 MMOL/L — SIGNIFICANT CHANGE UP (ref 98–107)
CHLORIDE SERPL-SCNC: 104 MMOL/L — SIGNIFICANT CHANGE UP (ref 98–107)
CO2 SERPL-SCNC: 24 MMOL/L — SIGNIFICANT CHANGE UP (ref 22–31)
CO2 SERPL-SCNC: 27 MMOL/L — SIGNIFICANT CHANGE UP (ref 22–31)
CREAT SERPL-MCNC: 0.68 MG/DL — SIGNIFICANT CHANGE UP (ref 0.5–1.3)
CREAT SERPL-MCNC: 0.71 MG/DL — SIGNIFICANT CHANGE UP (ref 0.5–1.3)
GLUCOSE SERPL-MCNC: 100 MG/DL — HIGH (ref 70–99)
GLUCOSE SERPL-MCNC: 100 MG/DL — HIGH (ref 70–99)
POTASSIUM SERPL-MCNC: 3 MMOL/L — LOW (ref 3.5–5.3)
POTASSIUM SERPL-MCNC: 3.4 MMOL/L — LOW (ref 3.5–5.3)
POTASSIUM SERPL-SCNC: 3 MMOL/L — LOW (ref 3.5–5.3)
POTASSIUM SERPL-SCNC: 3.4 MMOL/L — LOW (ref 3.5–5.3)
SODIUM SERPL-SCNC: 145 MMOL/L — SIGNIFICANT CHANGE UP (ref 135–145)
SODIUM SERPL-SCNC: 145 MMOL/L — SIGNIFICANT CHANGE UP (ref 135–145)

## 2018-12-03 RX ORDER — POTASSIUM CHLORIDE 20 MEQ
10 PACKET (EA) ORAL ONCE
Qty: 0 | Refills: 0 | Status: COMPLETED | OUTPATIENT
Start: 2018-12-03 | End: 2018-12-03

## 2018-12-03 RX ORDER — MAGNESIUM SULFATE 500 MG/ML
1 VIAL (ML) INJECTION ONCE
Qty: 0 | Refills: 0 | Status: COMPLETED | OUTPATIENT
Start: 2018-12-03 | End: 2018-12-03

## 2018-12-03 RX ADMIN — Medication 10 MILLIEQUIVALENT(S): at 06:09

## 2018-12-03 RX ADMIN — Medication 50 MILLIGRAM(S): at 07:16

## 2018-12-03 RX ADMIN — Medication 25 MILLIGRAM(S): at 10:30

## 2018-12-03 RX ADMIN — Medication 100 MILLIEQUIVALENT(S): at 06:09

## 2018-12-03 RX ADMIN — Medication 100 GRAM(S): at 10:30

## 2018-12-03 RX ADMIN — Medication 10 MILLIEQUIVALENT(S): at 08:42

## 2018-12-03 RX ADMIN — Medication 100 MILLIEQUIVALENT(S): at 05:03

## 2018-12-03 RX ADMIN — Medication 100 MILLIEQUIVALENT(S): at 01:34

## 2019-10-10 ENCOUNTER — EMERGENCY (EMERGENCY)
Facility: HOSPITAL | Age: 59
LOS: 1 days | Discharge: HOME | End: 2019-10-10
Attending: EMERGENCY MEDICINE | Admitting: EMERGENCY MEDICINE
Payer: MEDICARE

## 2019-10-10 VITALS
RESPIRATION RATE: 16 BRPM | OXYGEN SATURATION: 100 % | SYSTOLIC BLOOD PRESSURE: 128 MMHG | HEART RATE: 74 BPM | TEMPERATURE: 97 F | DIASTOLIC BLOOD PRESSURE: 79 MMHG

## 2019-10-10 VITALS
TEMPERATURE: 98 F | OXYGEN SATURATION: 100 % | HEART RATE: 76 BPM | RESPIRATION RATE: 16 BRPM | DIASTOLIC BLOOD PRESSURE: 70 MMHG | SYSTOLIC BLOOD PRESSURE: 130 MMHG

## 2019-10-10 PROCEDURE — 70450 CT HEAD/BRAIN W/O DYE: CPT | Mod: 26

## 2019-10-10 PROCEDURE — 99283 EMERGENCY DEPT VISIT LOW MDM: CPT

## 2019-10-10 PROCEDURE — 72125 CT NECK SPINE W/O DYE: CPT | Mod: 26

## 2019-10-10 NOTE — ED PROVIDER NOTE - OBJECTIVE STATEMENT
58 y/o M with h/o ETOH abuse, HTN BIB EMS after found sleeping on the ground in front of someone's car.  Pt denies any fall or injury, but reports diffuse back pain "for a long time" - noted to have a lidoderm patch on low back.  Per EMS, pt reported drinking alcohol tonight initially but denies here.      Attempted to contact collaterals listed on chart without response,  mailbox full. 58 y/o M with h/o ETOH abuse, HTN BIB EMS after found sleeping on the ground in front of someone's car.  Pt denies any fall or injury, but reports diffuse back pain "for a long time" - noted to have a lidoderm patch on low back.  Per EMS, pt reported drinking alcohol tonight initially but denies here.  States "Im tired, I want to sleep."    Attempted to contact collaterals listed on chart without response,  mailbox full.

## 2019-10-10 NOTE — ED PROVIDER NOTE - PROGRESS NOTE DETAILS
On reassessment, patient still not responding to questions, when re-examining, patient grimaces with palpation to abdomen in periumbilical region, but cannot verbalize if he has pain. Will continue to monitor and reassess. reassessment, patient more alert, abdomen is soft, patient is denying abdominal pain at this time, denies ETOH use last night. Will continue to monitor and pend CT head/c-spine. CTs no acute changes. Pt denies acute complaints, A&ox3 ambulating with unsteady gait will reassess in 1 hr Silver pgy2. Pt ambulated with steady gait, tolerated PO, will maite.

## 2019-10-10 NOTE — ED PROVIDER NOTE - NSFOLLOWUPINSTRUCTIONS_ED_ALL_ED_FT
Please call or see your doctor or return to the ER if you have new chest pain, shortness of breath, fevers, inability to eat or drink, or worsening of symptoms that brought you to the emergency room today.      Please follow up with your primary care physician in 1-2 days or as soon as possible.

## 2019-10-10 NOTE — ED ADULT TRIAGE NOTE - CHIEF COMPLAINT QUOTE
As per EMS pt was found sleeping in front of someone's car. Pt appears intoxicated, denies drinking alcohol although admitted to PD prior to arrival that he was drinking tonight. Stating "I just wanna go home, I love my wife". H/o anemia, anxiety, ETOH abuse.

## 2019-10-10 NOTE — ED PROVIDER NOTE - CLINICAL SUMMARY MEDICAL DECISION MAKING FREE TEXT BOX
60 y/o M BIB EMS after found sleeping in the street.  Likely intoxication, no external e/o acute trauma, but given intoxication and unreliable hx will obtain ct head/c-spine, fingerstick, reassess.

## 2019-10-10 NOTE — ED PROVIDER NOTE - PATIENT PORTAL LINK FT
You can access the FollowMyHealth Patient Portal offered by [facility] by registering at the following website: [email or web address]. By joining [employer]’s FollowMyHealth portal, you will also be able to view your health information using other applications (apps) compatible with our system.

## 2019-10-10 NOTE — ED ADULT NURSE NOTE - OBJECTIVE STATEMENT
Patient states he arrived by ambulance for "not feeling well." Denies ETOH use, clearly unsteady & drowsy, will continue to reassess. Cannot state much other information, poor historian

## 2020-01-16 ENCOUNTER — EMERGENCY (EMERGENCY)
Facility: HOSPITAL | Age: 60
LOS: 1 days | Discharge: LEFT BEFORE DISCHARGE | End: 2020-01-16
Attending: EMERGENCY MEDICINE
Payer: COMMERCIAL

## 2020-01-16 VITALS
SYSTOLIC BLOOD PRESSURE: 105 MMHG | HEART RATE: 94 BPM | DIASTOLIC BLOOD PRESSURE: 73 MMHG | OXYGEN SATURATION: 100 % | RESPIRATION RATE: 18 BRPM

## 2020-01-16 LAB
ALBUMIN SERPL ELPH-MCNC: 2.9 G/DL — LOW (ref 3.5–5)
ALP SERPL-CCNC: 200 U/L — HIGH (ref 40–120)
ALT FLD-CCNC: 43 U/L DA — SIGNIFICANT CHANGE UP (ref 10–60)
ANION GAP SERPL CALC-SCNC: 12 MMOL/L — SIGNIFICANT CHANGE UP (ref 5–17)
AST SERPL-CCNC: 91 U/L — HIGH (ref 10–40)
BILIRUB SERPL-MCNC: 1 MG/DL — SIGNIFICANT CHANGE UP (ref 0.2–1.2)
BUN SERPL-MCNC: 20 MG/DL — HIGH (ref 7–18)
CALCIUM SERPL-MCNC: 7.7 MG/DL — LOW (ref 8.4–10.5)
CHLORIDE SERPL-SCNC: 104 MMOL/L — SIGNIFICANT CHANGE UP (ref 96–108)
CO2 SERPL-SCNC: 26 MMOL/L — SIGNIFICANT CHANGE UP (ref 22–31)
CREAT SERPL-MCNC: 1.12 MG/DL — SIGNIFICANT CHANGE UP (ref 0.5–1.3)
ETHANOL SERPL-MCNC: 338 MG/DL — HIGH (ref 0–10)
GLUCOSE SERPL-MCNC: 104 MG/DL — HIGH (ref 70–99)
HCT VFR BLD CALC: 23.6 % — LOW (ref 39–50)
HGB BLD-MCNC: 6.5 G/DL — CRITICAL LOW (ref 13–17)
MCHC RBC-ENTMCNC: 20.2 PG — LOW (ref 27–34)
MCHC RBC-ENTMCNC: 27.5 GM/DL — LOW (ref 32–36)
MCV RBC AUTO: 73.5 FL — LOW (ref 80–100)
NRBC # BLD: 0 /100 WBCS — SIGNIFICANT CHANGE UP (ref 0–0)
NT-PROBNP SERPL-SCNC: 993 PG/ML — HIGH (ref 0–125)
PLATELET # BLD AUTO: 202 K/UL — SIGNIFICANT CHANGE UP (ref 150–400)
POTASSIUM SERPL-MCNC: 3.3 MMOL/L — LOW (ref 3.5–5.3)
POTASSIUM SERPL-SCNC: 3.3 MMOL/L — LOW (ref 3.5–5.3)
PROT SERPL-MCNC: 7.3 G/DL — SIGNIFICANT CHANGE UP (ref 6–8.3)
RBC # BLD: 3.21 M/UL — LOW (ref 4.2–5.8)
RBC # FLD: 24.2 % — HIGH (ref 10.3–14.5)
SODIUM SERPL-SCNC: 142 MMOL/L — SIGNIFICANT CHANGE UP (ref 135–145)
TROPONIN I SERPL-MCNC: 0.03 NG/ML — SIGNIFICANT CHANGE UP (ref 0–0.04)
WBC # BLD: 6.1 K/UL — SIGNIFICANT CHANGE UP (ref 3.8–10.5)
WBC # FLD AUTO: 6.1 K/UL — SIGNIFICANT CHANGE UP (ref 3.8–10.5)

## 2020-01-16 PROCEDURE — 99285 EMERGENCY DEPT VISIT HI MDM: CPT

## 2020-01-17 VITALS
RESPIRATION RATE: 18 BRPM | TEMPERATURE: 98 F | SYSTOLIC BLOOD PRESSURE: 109 MMHG | DIASTOLIC BLOOD PRESSURE: 66 MMHG | OXYGEN SATURATION: 99 % | HEART RATE: 88 BPM

## 2020-01-17 LAB
HCT VFR BLD CALC: 21.7 % — LOW (ref 39–50)
HGB BLD-MCNC: 6 G/DL — CRITICAL LOW (ref 13–17)
MCHC RBC-ENTMCNC: 20.3 PG — LOW (ref 27–34)
MCHC RBC-ENTMCNC: 27.6 GM/DL — LOW (ref 32–36)
MCV RBC AUTO: 73.3 FL — LOW (ref 80–100)
NRBC # BLD: 0 /100 WBCS — SIGNIFICANT CHANGE UP (ref 0–0)
PLATELET # BLD AUTO: 187 K/UL — SIGNIFICANT CHANGE UP (ref 150–400)
RBC # BLD: 2.96 M/UL — LOW (ref 4.2–5.8)
RBC # FLD: 24.1 % — HIGH (ref 10.3–14.5)
TROPONIN I SERPL-MCNC: 0.02 NG/ML — SIGNIFICANT CHANGE UP (ref 0–0.04)
WBC # BLD: 5.96 K/UL — SIGNIFICANT CHANGE UP (ref 3.8–10.5)
WBC # FLD AUTO: 5.96 K/UL — SIGNIFICANT CHANGE UP (ref 3.8–10.5)

## 2020-01-17 PROCEDURE — 85027 COMPLETE CBC AUTOMATED: CPT

## 2020-01-17 PROCEDURE — 84484 ASSAY OF TROPONIN QUANT: CPT

## 2020-01-17 PROCEDURE — 83880 ASSAY OF NATRIURETIC PEPTIDE: CPT

## 2020-01-17 PROCEDURE — 93005 ELECTROCARDIOGRAM TRACING: CPT

## 2020-01-17 PROCEDURE — 80307 DRUG TEST PRSMV CHEM ANLYZR: CPT

## 2020-01-17 PROCEDURE — 36415 COLL VENOUS BLD VENIPUNCTURE: CPT

## 2020-01-17 PROCEDURE — 96374 THER/PROPH/DIAG INJ IV PUSH: CPT

## 2020-01-17 PROCEDURE — 80053 COMPREHEN METABOLIC PANEL: CPT

## 2020-01-17 PROCEDURE — 71045 X-RAY EXAM CHEST 1 VIEW: CPT | Mod: 26

## 2020-01-17 PROCEDURE — 99285 EMERGENCY DEPT VISIT HI MDM: CPT | Mod: 25

## 2020-01-17 PROCEDURE — 71045 X-RAY EXAM CHEST 1 VIEW: CPT

## 2020-01-17 PROCEDURE — 99283 EMERGENCY DEPT VISIT LOW MDM: CPT

## 2020-01-17 RX ORDER — FUROSEMIDE 40 MG
40 TABLET ORAL ONCE
Refills: 0 | Status: COMPLETED | OUTPATIENT
Start: 2020-01-17 | End: 2020-01-17

## 2020-01-17 RX ADMIN — Medication 40 MILLIGRAM(S): at 03:38

## 2020-01-17 NOTE — ED PROVIDER NOTE - PROGRESS NOTE DETAILS
EKG - nsr, rate 89, RBBB (seen on previous)   labs - Hgb 6.5 (microcytic), K 3.3, etoh 300, bnp 993  CXR - vascular congestion  Recommended admission for further management of anemia, edema with elevated bnp, and etoh abuse; pt declined. States he will not stay for admission. Pt's siblings at bedside tried to convince him to stay for admission but pt declined. Will discuss this decision with pt again when he is clinically sober. Repeat trop negative, repeat Hgb 6.0   I recommended admission to patient multiple times. He continues to decline admission and states "I feel fine and I'm going to leave." Pt is clinically sober, steady on feet, clear speech, normal neuro exam at this time; has capacity to make this decision to leave against medical advice. Discussed risks of leaving (including death, permanent disability, etc) vs. benefits of staying (transfusion, further eval and treatment, etc). All questions answered. Pt signed AMA form which was witnessed and placed in patient's chart.

## 2020-01-17 NOTE — ED PROVIDER NOTE - PHYSICAL EXAMINATION
GENERAL: well appearing, no acute distress, etoh on breath   HEAD: atraumatic   EYES: EOMI, pink conjunctiva   ENT: moist oral mucosa   CARDIAC: RRR, +mild bilateral LE edema, distal pulses present   RESPIRATORY: lungs CTAB, no increased work of breathing   GASTROINTESTINAL: no abdominal tenderness, no rebound or guarding, bowel sounds presents  GENITOURINARY: no CVA tenderness   MUSCULOSKELETAL: no deformity   NEUROLOGICAL: AAOx3, spontaneous movement of extremities   SKIN: intact   PSYCHIATRIC: cooperative   HEME LYMPH: no lymphadenopathy

## 2020-01-17 NOTE — ED PROVIDER NOTE - OBJECTIVE STATEMENT
60 yo M pmh of etoh abuse, anemia, and HTN presents with family who are concerned about pt's bilateral leg swelling for a few weeks. He has rx for lasix but does not take it consistently. Pt denies acute complaints; asking to sleep.

## 2020-01-17 NOTE — ED PROVIDER NOTE - NS ED ROS FT
CONSTITUTIONAL: no fever, no chills   EYES: no visual changes, no eye pain   ENMT: no nasal congestion, no throat pain  CARDIOVASCULAR: no chest pain, +edema, no palpitations   RESPIRATORY: no shortness of breath, no cough   GASTROINTESTINAL: no abdominal pain, no nausea, no vomiting, no diarrhea, no constipation   GENITOURINARY: no dysuria, no frequency  MUSCULOSKELETAL: no joint pains, no myalgias, no back pain   SKIN: no rashes  NEUROLOGICAL: no weakness, no headache, no dizziness, no slurred speech, no syncope   PSYCHIATRIC: no known mental health illness   HEME/LYMPH: no lymphadenopathy      All other ROS negative except as per HPI

## 2020-01-17 NOTE — ED PROVIDER NOTE - PATIENT PORTAL LINK FT
You can access the FollowMyHealth Patient Portal offered by [facility] by registering at the following website: [email or web address]. By joining [email or web address]’s FollowMyHealth portal, you will also be able to view your health information using other applications (apps) compatible with our system.

## 2020-01-19 ENCOUNTER — EMERGENCY (EMERGENCY)
Facility: HOSPITAL | Age: 60
LOS: 1 days | Discharge: HOME | End: 2020-01-19
Attending: EMERGENCY MEDICINE
Payer: COMMERCIAL

## 2020-01-19 VITALS
TEMPERATURE: 98 F | OXYGEN SATURATION: 98 % | HEART RATE: 70 BPM | RESPIRATION RATE: 16 BRPM | SYSTOLIC BLOOD PRESSURE: 114 MMHG | DIASTOLIC BLOOD PRESSURE: 77 MMHG | HEIGHT: 68 IN | WEIGHT: 169.98 LBS

## 2020-01-19 LAB
ALBUMIN SERPL ELPH-MCNC: 2.9 G/DL — LOW (ref 3.5–5)
ALP SERPL-CCNC: 223 U/L — HIGH (ref 40–120)
ALT FLD-CCNC: 48 U/L DA — SIGNIFICANT CHANGE UP (ref 10–60)
ANION GAP SERPL CALC-SCNC: 10 MMOL/L — SIGNIFICANT CHANGE UP (ref 5–17)
APTT BLD: 35 SEC — SIGNIFICANT CHANGE UP (ref 27.5–36.3)
AST SERPL-CCNC: 124 U/L — HIGH (ref 10–40)
BASOPHILS # BLD AUTO: 0.1 K/UL — SIGNIFICANT CHANGE UP (ref 0–0.2)
BASOPHILS NFR BLD AUTO: 2 % — SIGNIFICANT CHANGE UP (ref 0–2)
BILIRUB SERPL-MCNC: 0.9 MG/DL — SIGNIFICANT CHANGE UP (ref 0.2–1.2)
BUN SERPL-MCNC: 20 MG/DL — HIGH (ref 7–18)
CALCIUM SERPL-MCNC: 7.9 MG/DL — LOW (ref 8.4–10.5)
CHLORIDE SERPL-SCNC: 100 MMOL/L — SIGNIFICANT CHANGE UP (ref 96–108)
CO2 SERPL-SCNC: 25 MMOL/L — SIGNIFICANT CHANGE UP (ref 22–31)
CREAT SERPL-MCNC: 1.14 MG/DL — SIGNIFICANT CHANGE UP (ref 0.5–1.3)
EOSINOPHIL # BLD AUTO: 0.1 K/UL — SIGNIFICANT CHANGE UP (ref 0–0.5)
EOSINOPHIL NFR BLD AUTO: 2 % — SIGNIFICANT CHANGE UP (ref 0–6)
ETHANOL SERPL-MCNC: 440 MG/DL — HIGH (ref 0–10)
GLUCOSE SERPL-MCNC: 112 MG/DL — HIGH (ref 70–99)
HCT VFR BLD CALC: 22.5 % — LOW (ref 39–50)
HGB BLD-MCNC: 6.3 G/DL — CRITICAL LOW (ref 13–17)
INR BLD: 1.31 RATIO — HIGH (ref 0.88–1.16)
LYMPHOCYTES # BLD AUTO: 0.86 K/UL — LOW (ref 1–3.3)
LYMPHOCYTES # BLD AUTO: 18 % — SIGNIFICANT CHANGE UP (ref 13–44)
MCHC RBC-ENTMCNC: 19.9 PG — LOW (ref 27–34)
MCHC RBC-ENTMCNC: 28 GM/DL — LOW (ref 32–36)
MCV RBC AUTO: 71.2 FL — LOW (ref 80–100)
MONOCYTES # BLD AUTO: 0.43 K/UL — SIGNIFICANT CHANGE UP (ref 0–0.9)
MONOCYTES NFR BLD AUTO: 9 % — SIGNIFICANT CHANGE UP (ref 2–14)
NEUTROPHILS # BLD AUTO: 3.31 K/UL — SIGNIFICANT CHANGE UP (ref 1.8–7.4)
NEUTROPHILS NFR BLD AUTO: 67 % — SIGNIFICANT CHANGE UP (ref 43–77)
PLATELET # BLD AUTO: 188 K/UL — SIGNIFICANT CHANGE UP (ref 150–400)
POTASSIUM SERPL-MCNC: 3.5 MMOL/L — SIGNIFICANT CHANGE UP (ref 3.5–5.3)
POTASSIUM SERPL-SCNC: 3.5 MMOL/L — SIGNIFICANT CHANGE UP (ref 3.5–5.3)
PROT SERPL-MCNC: 7.5 G/DL — SIGNIFICANT CHANGE UP (ref 6–8.3)
PROTHROM AB SERPL-ACNC: 14.7 SEC — HIGH (ref 10–12.9)
RBC # BLD: 3.16 M/UL — LOW (ref 4.2–5.8)
RBC # FLD: 23.3 % — HIGH (ref 10.3–14.5)
SODIUM SERPL-SCNC: 135 MMOL/L — SIGNIFICANT CHANGE UP (ref 135–145)
WBC # BLD: 4.8 K/UL — SIGNIFICANT CHANGE UP (ref 3.8–10.5)
WBC # FLD AUTO: 4.8 K/UL — SIGNIFICANT CHANGE UP (ref 3.8–10.5)

## 2020-01-19 PROCEDURE — 80053 COMPREHEN METABOLIC PANEL: CPT

## 2020-01-19 PROCEDURE — 85610 PROTHROMBIN TIME: CPT

## 2020-01-19 PROCEDURE — 86850 RBC ANTIBODY SCREEN: CPT

## 2020-01-19 PROCEDURE — 99284 EMERGENCY DEPT VISIT MOD MDM: CPT

## 2020-01-19 PROCEDURE — 85730 THROMBOPLASTIN TIME PARTIAL: CPT

## 2020-01-19 PROCEDURE — 85027 COMPLETE CBC AUTOMATED: CPT

## 2020-01-19 PROCEDURE — 36415 COLL VENOUS BLD VENIPUNCTURE: CPT

## 2020-01-19 PROCEDURE — 86901 BLOOD TYPING SEROLOGIC RH(D): CPT

## 2020-01-19 PROCEDURE — 86900 BLOOD TYPING SEROLOGIC ABO: CPT

## 2020-01-19 PROCEDURE — 93005 ELECTROCARDIOGRAM TRACING: CPT

## 2020-01-19 PROCEDURE — 82962 GLUCOSE BLOOD TEST: CPT

## 2020-01-19 PROCEDURE — 99285 EMERGENCY DEPT VISIT HI MDM: CPT

## 2020-01-19 PROCEDURE — 80307 DRUG TEST PRSMV CHEM ANLYZR: CPT

## 2020-01-19 NOTE — ED PROVIDER NOTE - CARE PLAN
Principal Discharge DX:	Alcoholic intoxication without complication  Secondary Diagnosis:	Anemia, unspecified type

## 2020-01-19 NOTE — ED PROVIDER NOTE - NSFOLLOWUPINSTRUCTIONS_ED_ALL_ED_FT
Alcohol Use Disorder    WHAT YOU NEED TO KNOW:    Alcohol use disorder (AUD) is problem drinking. AUD includes alcohol abuse and alcohol dependence. Alcohol can damage your brain, heart, kidneys, lungs, and liver. Your risk of stroke is greater if you have 5 or more drinks each day. If you are pregnant, you and your baby are at risk for serious health problems. No amount of alcohol is safe during pregnancy.    DISCHARGE INSTRUCTIONS:    Call your local emergency number (911 in the ) if:     You have seizures.        Call your doctor if:     Your heart is beating faster than usual.      You have hallucinations.      You cannot remember what happens while you are drinking.      You are anxious and have nausea.      Your hands are shaky and you are sweating heavily.      You have questions or concerns about your condition or care.    Follow up with your healthcare provider as directed: Do not try to stop drinking on your own. Your healthcare provider may need to help you withdraw from alcohol safely. He or she may need to admit you to the hospital. You may also need any of the following:    Medicines to decrease your craving for alcohol      Support groups such as Alcoholics Anonymous       Therapy from a psychiatrist or psychologist       Admission to an inpatient facility for treatment for severe AUD    For support and more information:     Substance Abuse and Mental Health Services Administration  [address]  [place] [address]  Web Address: http://www.samhsa.gov      Alcoholics Anonymous  Web Address: http://www.aa.org           © Copyright [employer] 2020       back to top          Anemia    WHAT YOU NEED TO KNOW:    Anemia is a low number of red blood cells or a low amount of hemoglobin in your red blood cells. Hemoglobin is a protein that helps carry oxygen throughout your body. Red blood cells use iron to create hemoglobin. Anemia may develop if your body does not have enough iron. It may also develop if your body does not make enough red blood cells or they die faster than your body can make them.     DISCHARGE INSTRUCTIONS:    Call 911 or have someone call 911 for any of the following:     You lose consciousness.      You have severe chest pain.    Return to the emergency department if:     You have dark or bloody bowel movements.        Contact your healthcare provider if:     Your symptoms are worse, even after treatment.      You have questions or concerns about your condition or care.    Medicines:     Iron or folic acid supplements help increase your red blood cell and hemoglobin levels.       Vitamin B12 injections may help boost your red blood cell level and decrease your symptoms. Ask your healthcare provider how to inject B12.      Take your medicine as directed. Contact your healthcare provider if you think your medicine is not helping or if you have side effects. Tell him of her if you are allergic to any medicine. Keep a list of the medicines, vitamins, and herbs you take. Include the amounts, and when and why you take them. Bring the list or the pill bottles to follow-up visits. Carry your medicine list with you in case of an emergency.    Prevent anemia: Eat healthy foods rich in iron and vitamin C. Nuts, meat, dark leafy green vegetables, and beans are high in iron and protein. Vitamin C helps your body absorb iron. Foods rich in vitamin C include oranges and other citrus fruits. Ask your healthcare provider for a list of other foods that are high in iron or vitamin C. Ask if you need to be on a special diet.     Follow up with your healthcare provider as directed: Write down your questions so you remember to ask them during your visits.        © Copyright [employer] 2020       back to top                      © Copyright [employer] 2020                   © Copyright [employer] 2020

## 2020-01-19 NOTE — ED ADULT NURSE NOTE - NSIMPLEMENTINTERV_GEN_ALL_ED
Implemented All Fall Risk Interventions:  [place] to call system. Call bell, personal items and telephone within reach. Instruct patient to call for assistance. Room bathroom lighting operational. Non-slip footwear when patient is off stretcher. Physically safe environment: no spills, clutter or unnecessary equipment. Stretcher in lowest position, wheels locked, appropriate side rails in place. Provide visual cue, wrist band, yellow gown, etc. Monitor gait and stability. Monitor for mental status changes and reorient to person, place, and time. Review medications for side effects contributing to fall risk. Reinforce activity limits and safety measures with patient and family.

## 2020-01-19 NOTE — ED PROVIDER NOTE - PROGRESS NOTE DETAILS
Pt's sister came to the ER. Pt cursed at the sister so she had to leave room. Spoke to the sister with pt's permission and sister states pt has been homeless for one year, coming homes occasionally to shower. Pt refuses assistance offered by family. Pt is sleeping but awakens to voice. Pt is refusing admission. Pt states he came because family forced him to come. Pt doesn't want blood a transfusion. Pt currently still intoxicated, will reassess when sober. Patient AAOx3, gait steady, speech clear. Insists on being discharged. Refused admission, blood transfusion, rectal exam. I explained to patient that he needs inpatient treatment and leaving the hospital could lead to permanent disability or death. Patient states understanding. Patient is clinically sober. I called patient's brother Ángel with patient's permission. Informed him that patient is leaving. He states he has tried to get patient into treatment for the past 3 years but patient refuses.

## 2020-01-19 NOTE — ED PROVIDER NOTE - PATIENT PORTAL LINK FT
You can access the FollowMyHealth Patient Portal offered by [facility] by registering at the following website: [email or web address]. By joining [employer]’s FollowMyHealth portal, you will also be able to view your health information using other applications (apps) compatible with our system.

## 2020-01-19 NOTE — ED ADULT TRIAGE NOTE - CHIEF COMPLAINT QUOTE
Sister stated " my brother felt weak, was seen by his PCP 3 days ago, was told has low hemoglobin(6.5),low k+, alcohol level was 300,but refused to seek medical help at the time ,however today he complained he can't walk "

## 2020-01-19 NOTE — ED PROVIDER NOTE - OBJECTIVE STATEMENT
Pt is a 59y M with significant PMHx of alcoholism, anemia, anxiety, htn and no significant PSHx. Pt was seen here 3 days ago for alcohol intoxication and was diagnosed as anemic. Doctor recommend admission but pt refused. Today pt's brother brought him back for treatment but brother left after dropping the pt off. Pt feels tired but denies any headache, chest pain, shortness of breath, abdominal pain, nausea, vomiting, diarrhea, melena, black stool or SI/HI. Pt has NKDA and currently denies any additional complaints at this time.

## 2020-01-20 VITALS
DIASTOLIC BLOOD PRESSURE: 67 MMHG | RESPIRATION RATE: 18 BRPM | HEART RATE: 89 BPM | OXYGEN SATURATION: 97 % | TEMPERATURE: 98 F | SYSTOLIC BLOOD PRESSURE: 110 MMHG

## 2020-01-23 ENCOUNTER — INPATIENT (INPATIENT)
Facility: HOSPITAL | Age: 60
LOS: 0 days | Discharge: LEFT BEFORE DISCHARGE | DRG: 812 | End: 2020-01-23
Attending: INTERNAL MEDICINE | Admitting: INTERNAL MEDICINE
Payer: COMMERCIAL

## 2020-01-23 VITALS
DIASTOLIC BLOOD PRESSURE: 58 MMHG | OXYGEN SATURATION: 100 % | HEART RATE: 85 BPM | RESPIRATION RATE: 18 BRPM | TEMPERATURE: 98 F | SYSTOLIC BLOOD PRESSURE: 98 MMHG

## 2020-01-23 VITALS
RESPIRATION RATE: 20 BRPM | HEIGHT: 68 IN | DIASTOLIC BLOOD PRESSURE: 76 MMHG | WEIGHT: 158.95 LBS | TEMPERATURE: 97 F | SYSTOLIC BLOOD PRESSURE: 110 MMHG | HEART RATE: 98 BPM | OXYGEN SATURATION: 100 %

## 2020-01-23 DIAGNOSIS — D64.9 ANEMIA, UNSPECIFIED: ICD-10-CM

## 2020-01-23 LAB
ALBUMIN SERPL ELPH-MCNC: 2.8 G/DL — LOW (ref 3.5–5)
ALP SERPL-CCNC: 237 U/L — HIGH (ref 40–120)
ALT FLD-CCNC: 47 U/L DA — SIGNIFICANT CHANGE UP (ref 10–60)
ANION GAP SERPL CALC-SCNC: 8 MMOL/L — SIGNIFICANT CHANGE UP (ref 5–17)
APTT BLD: 32.9 SEC — SIGNIFICANT CHANGE UP (ref 27.5–36.3)
AST SERPL-CCNC: 101 U/L — HIGH (ref 10–40)
BASOPHILS # BLD AUTO: 0.08 K/UL — SIGNIFICANT CHANGE UP (ref 0–0.2)
BASOPHILS NFR BLD AUTO: 1.4 % — SIGNIFICANT CHANGE UP (ref 0–2)
BILIRUB SERPL-MCNC: 1.7 MG/DL — HIGH (ref 0.2–1.2)
BUN SERPL-MCNC: 32 MG/DL — HIGH (ref 7–18)
CALCIUM SERPL-MCNC: 7.4 MG/DL — LOW (ref 8.4–10.5)
CHLORIDE SERPL-SCNC: 100 MMOL/L — SIGNIFICANT CHANGE UP (ref 96–108)
CO2 SERPL-SCNC: 26 MMOL/L — SIGNIFICANT CHANGE UP (ref 22–31)
CREAT SERPL-MCNC: 1.49 MG/DL — HIGH (ref 0.5–1.3)
EOSINOPHIL # BLD AUTO: 0.11 K/UL — SIGNIFICANT CHANGE UP (ref 0–0.5)
EOSINOPHIL NFR BLD AUTO: 2 % — SIGNIFICANT CHANGE UP (ref 0–6)
ETHANOL SERPL-MCNC: 280 MG/DL — HIGH (ref 0–10)
GLUCOSE SERPL-MCNC: 108 MG/DL — HIGH (ref 70–99)
HCT VFR BLD CALC: 21.9 % — LOW (ref 39–50)
HGB BLD-MCNC: 6.1 G/DL — CRITICAL LOW (ref 13–17)
IMM GRANULOCYTES NFR BLD AUTO: 0.5 % — SIGNIFICANT CHANGE UP (ref 0–1.5)
INR BLD: 1.49 RATIO — HIGH (ref 0.88–1.16)
IRON SATN MFR SERPL: 13 UG/DL — LOW (ref 65–170)
IRON SATN MFR SERPL: 3 % — LOW (ref 20–55)
LYMPHOCYTES # BLD AUTO: 0.81 K/UL — LOW (ref 1–3.3)
LYMPHOCYTES # BLD AUTO: 14.6 % — SIGNIFICANT CHANGE UP (ref 13–44)
MAGNESIUM SERPL-MCNC: 1.6 MG/DL — SIGNIFICANT CHANGE UP (ref 1.6–2.6)
MCHC RBC-ENTMCNC: 20.2 PG — LOW (ref 27–34)
MCHC RBC-ENTMCNC: 27.9 GM/DL — LOW (ref 32–36)
MCV RBC AUTO: 72.5 FL — LOW (ref 80–100)
MONOCYTES # BLD AUTO: 0.53 K/UL — SIGNIFICANT CHANGE UP (ref 0–0.9)
MONOCYTES NFR BLD AUTO: 9.5 % — SIGNIFICANT CHANGE UP (ref 2–14)
NEUTROPHILS # BLD AUTO: 4 K/UL — SIGNIFICANT CHANGE UP (ref 1.8–7.4)
NEUTROPHILS NFR BLD AUTO: 72 % — SIGNIFICANT CHANGE UP (ref 43–77)
NRBC # BLD: 0 /100 WBCS — SIGNIFICANT CHANGE UP (ref 0–0)
OB PNL STL: NEGATIVE — SIGNIFICANT CHANGE UP
PLATELET # BLD AUTO: 136 K/UL — LOW (ref 150–400)
POTASSIUM SERPL-MCNC: 3.5 MMOL/L — SIGNIFICANT CHANGE UP (ref 3.5–5.3)
POTASSIUM SERPL-SCNC: 3.5 MMOL/L — SIGNIFICANT CHANGE UP (ref 3.5–5.3)
PROT SERPL-MCNC: 7.5 G/DL — SIGNIFICANT CHANGE UP (ref 6–8.3)
PROTHROM AB SERPL-ACNC: 16.7 SEC — HIGH (ref 10–12.9)
RBC # BLD: 3.02 M/UL — LOW (ref 4.2–5.8)
RBC # FLD: 23.2 % — HIGH (ref 10.3–14.5)
RETICS #: 58.8 K/UL — SIGNIFICANT CHANGE UP (ref 25–125)
RETICS/RBC NFR: 2 % — SIGNIFICANT CHANGE UP (ref 0.5–2.5)
SODIUM SERPL-SCNC: 134 MMOL/L — LOW (ref 135–145)
TIBC SERPL-MCNC: 445 UG/DL — SIGNIFICANT CHANGE UP (ref 250–450)
UIBC SERPL-MCNC: 432 UG/DL — HIGH (ref 110–370)
WBC # BLD: 5.56 K/UL — SIGNIFICANT CHANGE UP (ref 3.8–10.5)
WBC # FLD AUTO: 5.56 K/UL — SIGNIFICANT CHANGE UP (ref 3.8–10.5)

## 2020-01-23 PROCEDURE — 99284 EMERGENCY DEPT VISIT MOD MDM: CPT

## 2020-01-23 PROCEDURE — 83540 ASSAY OF IRON: CPT

## 2020-01-23 PROCEDURE — 86850 RBC ANTIBODY SCREEN: CPT

## 2020-01-23 PROCEDURE — 93005 ELECTROCARDIOGRAM TRACING: CPT

## 2020-01-23 PROCEDURE — 36415 COLL VENOUS BLD VENIPUNCTURE: CPT

## 2020-01-23 PROCEDURE — 82728 ASSAY OF FERRITIN: CPT

## 2020-01-23 PROCEDURE — 76705 ECHO EXAM OF ABDOMEN: CPT

## 2020-01-23 PROCEDURE — 76705 ECHO EXAM OF ABDOMEN: CPT | Mod: 26

## 2020-01-23 PROCEDURE — 85027 COMPLETE CBC AUTOMATED: CPT

## 2020-01-23 PROCEDURE — 83735 ASSAY OF MAGNESIUM: CPT

## 2020-01-23 PROCEDURE — 82962 GLUCOSE BLOOD TEST: CPT

## 2020-01-23 PROCEDURE — P9040: CPT

## 2020-01-23 PROCEDURE — 86923 COMPATIBILITY TEST ELECTRIC: CPT

## 2020-01-23 PROCEDURE — 86901 BLOOD TYPING SEROLOGIC RH(D): CPT

## 2020-01-23 PROCEDURE — 83550 IRON BINDING TEST: CPT

## 2020-01-23 PROCEDURE — 82272 OCCULT BLD FECES 1-3 TESTS: CPT

## 2020-01-23 PROCEDURE — 36430 TRANSFUSION BLD/BLD COMPNT: CPT

## 2020-01-23 PROCEDURE — 85610 PROTHROMBIN TIME: CPT

## 2020-01-23 PROCEDURE — 99285 EMERGENCY DEPT VISIT HI MDM: CPT | Mod: 25

## 2020-01-23 PROCEDURE — 82746 ASSAY OF FOLIC ACID SERUM: CPT

## 2020-01-23 PROCEDURE — 85730 THROMBOPLASTIN TIME PARTIAL: CPT

## 2020-01-23 PROCEDURE — 82607 VITAMIN B-12: CPT

## 2020-01-23 PROCEDURE — 86900 BLOOD TYPING SEROLOGIC ABO: CPT

## 2020-01-23 PROCEDURE — 80053 COMPREHEN METABOLIC PANEL: CPT

## 2020-01-23 PROCEDURE — 80307 DRUG TEST PRSMV CHEM ANLYZR: CPT

## 2020-01-23 PROCEDURE — 85045 AUTOMATED RETICULOCYTE COUNT: CPT

## 2020-01-23 RX ORDER — PANTOPRAZOLE SODIUM 20 MG/1
40 TABLET, DELAYED RELEASE ORAL EVERY 12 HOURS
Refills: 0 | Status: DISCONTINUED | OUTPATIENT
Start: 2020-01-23 | End: 2020-01-23

## 2020-01-23 RX ORDER — SODIUM CHLORIDE 9 MG/ML
500 INJECTION INTRAMUSCULAR; INTRAVENOUS; SUBCUTANEOUS ONCE
Refills: 0 | Status: DISCONTINUED | OUTPATIENT
Start: 2020-01-23 | End: 2020-01-23

## 2020-01-23 RX ORDER — DIPHENHYDRAMINE HCL 50 MG
15 CAPSULE ORAL ONCE
Refills: 0 | Status: COMPLETED | OUTPATIENT
Start: 2020-01-23 | End: 2020-01-23

## 2020-01-23 RX ORDER — THIAMINE MONONITRATE (VIT B1) 100 MG
500 TABLET ORAL EVERY 8 HOURS
Refills: 0 | Status: DISCONTINUED | OUTPATIENT
Start: 2020-01-23 | End: 2020-01-23

## 2020-01-23 RX ORDER — FOLIC ACID 0.8 MG
1 TABLET ORAL DAILY
Refills: 0 | Status: DISCONTINUED | OUTPATIENT
Start: 2020-01-23 | End: 2020-01-23

## 2020-01-23 RX ORDER — MULTIVIT-MIN/FERROUS GLUCONATE 9 MG/15 ML
1 LIQUID (ML) ORAL DAILY
Refills: 0 | Status: DISCONTINUED | OUTPATIENT
Start: 2020-01-23 | End: 2020-01-23

## 2020-01-23 RX ORDER — SODIUM CHLORIDE 9 MG/ML
1000 INJECTION, SOLUTION INTRAVENOUS
Refills: 0 | Status: DISCONTINUED | OUTPATIENT
Start: 2020-01-23 | End: 2020-01-23

## 2020-01-23 NOTE — H&P ADULT - HISTORY OF PRESENT ILLNESS
60 y/o M with a PMHx Anemia, Anxiety, HTN, ETOH abuse and no PSHx presents to ED for blood transfusion. Pt reports few days ago was in Hospital for a hemoglobin level of 6, required admission for blood transfusion but pt refused and left AMA. Pt is now presenting feeling weak, dyspnea on exertion. NKDA

## 2020-01-23 NOTE — ED PROVIDER NOTE - PROGRESS NOTE DETAILS
H/H noted.  Pt now refusing admission, says he is willing accept transfusion. H/H noted.  Pt now refusing admission, says he is willing accept transfusion.  Consent signed and in chart. H/H noted.  Pt says he is willing accept transfusion.  Consent signed and in chart.

## 2020-01-23 NOTE — ED PROVIDER NOTE - RATE
Overall seems to be doing well will continue all meds as is    Will need to recheck the A1c although diabetic control has been very good 95

## 2020-01-23 NOTE — ED ADULT NURSE NOTE - CHPI ED NUR SYMPTOMS NEG
no dizziness/no fever/no vomiting/no shortness of breath/no chest pain/no back pain/no chills/no diaphoresis/no congestion/no nausea/no syncope

## 2020-01-23 NOTE — ED ADULT NURSE NOTE - OBJECTIVE STATEMENT
Pt was brought to the Hospital by his son, he was seen here last week, was found to have a low Hgb but refused blood transfusion at that time  As per son, pt is alcoholic, pt denies drinking, states he only has 1-2 drinks socially  Denies any active bleeding, denies difficulty breathing, states he only feels tired

## 2020-01-23 NOTE — ED ADULT NURSE NOTE - NSIMPLEMENTINTERV_GEN_ALL_ED
Implemented All Fall Risk Interventions:  [place] to call system. Call bell, personal items and telephone within reach. Instruct patient to call for assistance. Room bathroom lighting operational. Non-slip footwear when patient is off stretcher. Physically safe environment: no spills, clutter or unnecessary equipment. Stretcher in lowest position, wheels locked, appropriate side rails in place. Provide visual cue, wrist band, yellow gown, etc. Monitor gait and stability. Monitor for mental status changes and reorient to person, place, and time. Review medications for side effects contributing to fall risk. Reinforce activity limits and safety measures with patient and family.

## 2020-01-23 NOTE — ED ADULT NURSE REASSESSMENT NOTE - NS ED NURSE REASSESS COMMENT FT1
pt refused to stay in the hospital. started at 5:15 pm .pt refused blood transfusion after 1 hour .md made aware .nurse manager and charge nurse made aware .pt remove iv line ..signed AMA .left from er with daughter .pt is aaox4. no acute distress noted .pt c/o dizziness .dr watts made aware .pt left from er with daughter .

## 2020-01-23 NOTE — H&P ADULT - ASSESSMENT
58 y/o M with a PMHx Anemia, Anxiety, HTN, ETOH abuse and no PSHx presents to ED for blood transfusion. Pt reports few days ago was in Hospital for a hemoglobin level of 6, required admission for blood transfusion but pt refused and left AMA. Pt is now presenting feeling weak, dyspnea on exertion. NKDA    pt seen in bed, vitals stable, physical exam reveals lungs cta b/l, heart s1s2, abd soft nd nt bs+, ext no edema. labs and diagnostic test result reviewed.    assessment  --  anemia, r/o gi bleed 2nd to esophageal varices vs pud, vs avm, etoh intoxication, h/o Anemia, Anxiety, HTN, ETOH abuse    plan  --  admit to med, transfuse 2units prbc, ativan as per ciwa protocolcont preadmit home meds, gi and dvt profilaxis,  cbc, bmp, mg, phos, lipids, tsh, bld cx, ua, ucx, spep, iron studies    ct abd-pelvv    gi cons  heme-onc  soc wk eval

## 2020-01-23 NOTE — H&P ADULT - ATTENDING COMMENTS
60 y/o M with a PMHx Anemia, Anxiety, HTN, ETOH abuse and no PSHx presents to ED for blood transfusion. Pt reports few days ago was in Hospital for a hemoglobin level of 6, required admission for blood transfusion but pt refused and left AMA. Pt is now presenting feeling weak, dyspnea on exertion. NKDA    pt seen in bed, vitals stable, physical exam reveals lungs cta b/l, heart s1s2, abd soft nd nt bs+, ext no edema. labs and diagnostic test result reviewed.    assessment  --  anemia, r/o gi bleed 2nd to esophageal varices vs pud, vs avm, etoh intoxication, h/o Anemia, Anxiety, HTN, ETOH abuse    plan  --  admit to med, transfuse 2units prbc, ativan as per ciwa protocolcont preadmit home meds, gi and dvt profilaxis,  cbc, bmp, mg, phos, lipids, tsh, bld cx, ua, ucx, spep, iron studies    ct abd-pelvv    gi cons  heme-onc  soc wk eval

## 2020-01-23 NOTE — ED PROVIDER NOTE - OBJECTIVE STATEMENT
58 y/o M with a PMHx Anemia, Anxiety, HTN, ETOH abuse and no PSHx presents to ED for blood transfusion. Pt reports few days ago was in Hospital for a hemoglobin level of 6, required admission for blood transfusion but pt refused and left AMA. Pt is now presenting feeling weak, dyspnea on exertion. NKDA.

## 2020-01-23 NOTE — CHART NOTE - NSCHARTNOTEFT_GEN_A_CORE
Pt was started on blood transfusion but pt states that he didn't want to stay. Pt repeatedly explained that his hemoglobin is very low, he is at risk on complications including death but he refused to get blood transfusion and stay. Pt understood communication and stated he can take oral medications but he has to leave. Pt and family informed to call 911 if he does't well. Spoke with pt daughter and son and explained the current condition. Nursing supervisor also spoke with him but he refused to stay. Pt is alert and oriented x3 was able to walk with any dizziness and imbalance.  AMA FORM Signed and placed in chart.  Pt also informed that we will send iron prescriptions to his pharmacy that he can take atleast for his anemia.  Attending paged.

## 2020-01-24 LAB
FERRITIN SERPL-MCNC: 49 NG/ML — SIGNIFICANT CHANGE UP (ref 30–400)
FOLATE SERPL-MCNC: 13.2 NG/ML — SIGNIFICANT CHANGE UP
VIT B12 SERPL-MCNC: 1467 PG/ML — HIGH (ref 232–1245)

## 2020-01-24 RX ORDER — SENNA PLUS 8.6 MG/1
1 TABLET ORAL
Qty: 30 | Refills: 0
Start: 2020-01-24 | End: 2020-02-22

## 2020-01-24 RX ORDER — FOLIC ACID 0.8 MG
1 TABLET ORAL
Qty: 30 | Refills: 0
Start: 2020-01-24 | End: 2020-02-22

## 2020-01-24 RX ORDER — FERROUS SULFATE 325(65) MG
1 TABLET ORAL
Qty: 30 | Refills: 0
Start: 2020-01-24 | End: 2020-02-22

## 2020-01-26 ENCOUNTER — INPATIENT (INPATIENT)
Facility: HOSPITAL | Age: 60
LOS: 0 days | Discharge: LEFT BEFORE DISCHARGE | DRG: 812 | End: 2020-01-27
Attending: INTERNAL MEDICINE | Admitting: INTERNAL MEDICINE
Payer: COMMERCIAL

## 2020-01-26 VITALS
RESPIRATION RATE: 16 BRPM | DIASTOLIC BLOOD PRESSURE: 76 MMHG | TEMPERATURE: 98 F | SYSTOLIC BLOOD PRESSURE: 110 MMHG | OXYGEN SATURATION: 99 % | WEIGHT: 158.95 LBS | HEIGHT: 68 IN | HEART RATE: 102 BPM

## 2020-01-26 LAB
ALBUMIN SERPL ELPH-MCNC: 2.7 G/DL — LOW (ref 3.5–5)
ALP SERPL-CCNC: 263 U/L — HIGH (ref 40–120)
ALT FLD-CCNC: 46 U/L DA — SIGNIFICANT CHANGE UP (ref 10–60)
ANION GAP SERPL CALC-SCNC: 11 MMOL/L — SIGNIFICANT CHANGE UP (ref 5–17)
APTT BLD: 28.4 SEC — SIGNIFICANT CHANGE UP (ref 27.5–36.3)
AST SERPL-CCNC: 109 U/L — HIGH (ref 10–40)
BASOPHILS # BLD AUTO: 0.06 K/UL — SIGNIFICANT CHANGE UP (ref 0–0.2)
BASOPHILS NFR BLD AUTO: 1.1 % — SIGNIFICANT CHANGE UP (ref 0–2)
BILIRUB SERPL-MCNC: 1.3 MG/DL — HIGH (ref 0.2–1.2)
BUN SERPL-MCNC: 37 MG/DL — HIGH (ref 7–18)
CALCIUM SERPL-MCNC: 7.7 MG/DL — LOW (ref 8.4–10.5)
CHLORIDE SERPL-SCNC: 98 MMOL/L — SIGNIFICANT CHANGE UP (ref 96–108)
CK MB BLD-MCNC: 0.9 % — SIGNIFICANT CHANGE UP (ref 0–3.5)
CK MB CFR SERPL CALC: 2.6 NG/ML — SIGNIFICANT CHANGE UP (ref 0–3.6)
CK SERPL-CCNC: 282 U/L — HIGH (ref 35–232)
CO2 SERPL-SCNC: 24 MMOL/L — SIGNIFICANT CHANGE UP (ref 22–31)
CREAT SERPL-MCNC: 1.49 MG/DL — HIGH (ref 0.5–1.3)
EOSINOPHIL # BLD AUTO: 0.1 K/UL — SIGNIFICANT CHANGE UP (ref 0–0.5)
EOSINOPHIL NFR BLD AUTO: 1.9 % — SIGNIFICANT CHANGE UP (ref 0–6)
GLUCOSE SERPL-MCNC: 137 MG/DL — HIGH (ref 70–99)
HCT VFR BLD CALC: 21.6 % — LOW (ref 39–50)
HGB BLD-MCNC: 6.1 G/DL — CRITICAL LOW (ref 13–17)
IMM GRANULOCYTES NFR BLD AUTO: 0.2 % — SIGNIFICANT CHANGE UP (ref 0–1.5)
INR BLD: 1.42 RATIO — HIGH (ref 0.88–1.16)
LYMPHOCYTES # BLD AUTO: 0.76 K/UL — LOW (ref 1–3.3)
LYMPHOCYTES # BLD AUTO: 14.4 % — SIGNIFICANT CHANGE UP (ref 13–44)
MCHC RBC-ENTMCNC: 20.5 PG — LOW (ref 27–34)
MCHC RBC-ENTMCNC: 28.2 GM/DL — LOW (ref 32–36)
MCV RBC AUTO: 72.5 FL — LOW (ref 80–100)
MONOCYTES # BLD AUTO: 0.5 K/UL — SIGNIFICANT CHANGE UP (ref 0–0.9)
MONOCYTES NFR BLD AUTO: 9.5 % — SIGNIFICANT CHANGE UP (ref 2–14)
NEUTROPHILS # BLD AUTO: 3.83 K/UL — SIGNIFICANT CHANGE UP (ref 1.8–7.4)
NEUTROPHILS NFR BLD AUTO: 72.9 % — SIGNIFICANT CHANGE UP (ref 43–77)
NRBC # BLD: 0 /100 WBCS — SIGNIFICANT CHANGE UP (ref 0–0)
NT-PROBNP SERPL-SCNC: 1333 PG/ML — HIGH (ref 0–125)
PLATELET # BLD AUTO: 139 K/UL — LOW (ref 150–400)
POTASSIUM SERPL-MCNC: 3.6 MMOL/L — SIGNIFICANT CHANGE UP (ref 3.5–5.3)
POTASSIUM SERPL-SCNC: 3.6 MMOL/L — SIGNIFICANT CHANGE UP (ref 3.5–5.3)
PROT SERPL-MCNC: 7.1 G/DL — SIGNIFICANT CHANGE UP (ref 6–8.3)
PROTHROM AB SERPL-ACNC: 15.9 SEC — HIGH (ref 10–12.9)
RBC # BLD: 2.98 M/UL — LOW (ref 4.2–5.8)
RBC # FLD: 24.8 % — HIGH (ref 10.3–14.5)
SODIUM SERPL-SCNC: 133 MMOL/L — LOW (ref 135–145)
TROPONIN I SERPL-MCNC: <0.015 NG/ML — SIGNIFICANT CHANGE UP (ref 0–0.04)
WBC # BLD: 5.26 K/UL — SIGNIFICANT CHANGE UP (ref 3.8–10.5)
WBC # FLD AUTO: 5.26 K/UL — SIGNIFICANT CHANGE UP (ref 3.8–10.5)

## 2020-01-26 PROCEDURE — 71045 X-RAY EXAM CHEST 1 VIEW: CPT | Mod: 26

## 2020-01-26 PROCEDURE — 93010 ELECTROCARDIOGRAM REPORT: CPT

## 2020-01-26 NOTE — ED PROVIDER NOTE - CLINICAL SUMMARY MEDICAL DECISION MAKING FREE TEXT BOX
H/H 6.1/21.6 Will initiate blood transfusion; pt consents. H/H 6.1/21.6 Will initiate blood transfusion; pt consents.  12:25a- pt now consents for blood transfusion (refused and AMA on 1/23/20) MAR and Dr. Pires endorsed.   I had a detailed discussion with the patient and/or guardian regarding the historical points, exam findings, and any diagnostic results supporting the admit diagnosis.

## 2020-01-26 NOTE — ED ADULT TRIAGE NOTE - CHIEF COMPLAINT QUOTE
BIB sister, concerned because pt not feeling well, cough, congestion, swelling of feet and low blood count

## 2020-01-26 NOTE — ED ADULT TRIAGE NOTE - WEIGHT IN LBS
Patient comes to the clinic for a follow up anticoagulation visit.   Last INR 1/2/18 was 3.3.  Dose decreased per protocol.   Today's INR is 2.4 and is within goal range.    Current warfarin dosing verified with patient.   Patient was informed that their INR result was within therapeutic range and instructed to maintain current dose per protocol.    Patient was instructed to contact the [facility] with any unusual bleeding or bruising, any changes in medications or over the counter medications, start of antibiotics, changes in diet or health status, any acute illnesses, and if there are any other questions or concerns. Patient verbalized understanding of above.  Dr. Napoles is in the office today supervising the treatment. Note forwarded to physician for review.      
158.9

## 2020-01-26 NOTE — ED PROVIDER NOTE - OBJECTIVE STATEMENT
58 y/o M pt with a PMHx of Alcohol Abuse, Anema, Anxiety, and HTN and no significant PSHx BIB sister for progressive weakness and leg swelling x1 month. On evaluation, pt states, "I'm okay", and states his last drink was yesterday. Pt noted to be anemic w/low H&H. Pt denies any other acute complaints. NKDA. 58 y/o M pt with a PMHx of Alcohol Abuse, Anemia, Anxiety, and HTN and no significant PSHx BIB sister for progressive weakness and leg swelling x1 month. On evaluation, pt states, "I'm okay", and states his last drink was yesterday. Pt noted to be anemic w/low H&H. Pt denies any other acute complaints. NKDA.

## 2020-01-27 VITALS
SYSTOLIC BLOOD PRESSURE: 106 MMHG | OXYGEN SATURATION: 98 % | TEMPERATURE: 98 F | RESPIRATION RATE: 20 BRPM | DIASTOLIC BLOOD PRESSURE: 71 MMHG | HEART RATE: 108 BPM

## 2020-01-27 DIAGNOSIS — D64.9 ANEMIA, UNSPECIFIED: ICD-10-CM

## 2020-01-27 PROCEDURE — 84484 ASSAY OF TROPONIN QUANT: CPT

## 2020-01-27 PROCEDURE — 86850 RBC ANTIBODY SCREEN: CPT

## 2020-01-27 PROCEDURE — 85610 PROTHROMBIN TIME: CPT

## 2020-01-27 PROCEDURE — 71045 X-RAY EXAM CHEST 1 VIEW: CPT

## 2020-01-27 PROCEDURE — 85027 COMPLETE CBC AUTOMATED: CPT

## 2020-01-27 PROCEDURE — 86923 COMPATIBILITY TEST ELECTRIC: CPT

## 2020-01-27 PROCEDURE — 36415 COLL VENOUS BLD VENIPUNCTURE: CPT

## 2020-01-27 PROCEDURE — 83880 ASSAY OF NATRIURETIC PEPTIDE: CPT

## 2020-01-27 PROCEDURE — 80053 COMPREHEN METABOLIC PANEL: CPT

## 2020-01-27 PROCEDURE — 99285 EMERGENCY DEPT VISIT HI MDM: CPT

## 2020-01-27 PROCEDURE — 99285 EMERGENCY DEPT VISIT HI MDM: CPT | Mod: 25

## 2020-01-27 PROCEDURE — 86901 BLOOD TYPING SEROLOGIC RH(D): CPT

## 2020-01-27 PROCEDURE — 36430 TRANSFUSION BLD/BLD COMPNT: CPT

## 2020-01-27 PROCEDURE — P9040: CPT

## 2020-01-27 PROCEDURE — 82553 CREATINE MB FRACTION: CPT

## 2020-01-27 PROCEDURE — 93005 ELECTROCARDIOGRAM TRACING: CPT

## 2020-01-27 PROCEDURE — 85730 THROMBOPLASTIN TIME PARTIAL: CPT

## 2020-01-27 PROCEDURE — 86900 BLOOD TYPING SEROLOGIC ABO: CPT

## 2020-01-27 PROCEDURE — 82550 ASSAY OF CK (CPK): CPT

## 2020-01-27 RX ORDER — AMLODIPINE BESYLATE 2.5 MG/1
5 TABLET ORAL
Qty: 0 | Refills: 0 | DISCHARGE

## 2020-01-27 RX ORDER — CLONAZEPAM 1 MG
1 TABLET ORAL
Qty: 0 | Refills: 0 | DISCHARGE

## 2020-01-27 RX ORDER — THIAMINE MONONITRATE (VIT B1) 100 MG
1 TABLET ORAL
Qty: 0 | Refills: 0 | DISCHARGE

## 2020-01-27 NOTE — CHART NOTE - NSCHARTNOTEFT_GEN_A_CORE
Patient seen at bedside to gather history and do physical exam. Patient had received 1 unit PRBC already. Patient refused to get second unit and patient wanted to leave. Patient was explained the risks and benefits about staying in the hospital and not leaving against medical advice. Patient understood but still wanted to leave. Patient left AMA.

## 2020-01-27 NOTE — H&P ADULT - HISTORY OF PRESENT ILLNESS
Patient is a 60 yo M, with PMH of alcohol abuse, anemia, and anxiety, who came to the ED by sister for weakness and leg swelling. Patient was uncooperative during interview and full history unable to be obtained. Patient recently here few days ago for similar symptoms and low hemoglobin and left AMA.

## 2020-01-27 NOTE — ED ADULT NURSE REASSESSMENT NOTE - NS ED NURSE REASSESS COMMENT FT1
1st unit of blood completed at 1:30am, pt refused to receive 2nd unit of blood, Dr. Grewal made aware, didn't wish to continue treatment. Pt signed AMA form and left ED with sister.

## 2020-01-27 NOTE — ED ADULT NURSE REASSESSMENT NOTE - NS ED NURSE REASSESS COMMENT FT1
Pt reassessed, observed laying in bed, breathing room air, in no respiratory distress at time of reassessment. Pt is A&O x3, able ot make needs known, c/o of general weakness at this time. Pt unable to ambulate independently, needs assistance, skin intact, left AC #18Ga in place.  Pt receiving 1st unit of blood, tolerating well so far, no transfusion reaction noted at this time. Consent form filled and signed, placed in patient's chart. Nursing monitoring continues. Sister at bedside.

## 2020-01-27 NOTE — DISCHARGE NOTE PROVIDER - NSDCCPCAREPLAN_GEN_ALL_CORE_FT
PRINCIPAL DISCHARGE DIAGNOSIS  Diagnosis: Anemia  Assessment and Plan of Treatment: Patient received 1 unit of PRBC. Patient refused to get second unit and wanted to leave. Patient was explained the risks of leaving against medical advice and the complications that can occur, including death. Patient understood but still wanted to leave. Patient left AMA.

## 2020-01-27 NOTE — H&P ADULT - ASSESSMENT
Patient is a 58 yo M, with PMH of alcohol abuse, anemia, and anxiety, who came to the ED by sister for weakness and leg swelling. Patient was uncooperative during interview and full history unable to be obtained.    Patient recently here few days ago for low hemoglobin but refused transfusion and left AMA.    In ED, patient agreed for transfusion. Received 1 unit PRBC but refused to have second unit and wanted to leave. Patient was explained the risks and benefits of leaving against medical advice and explained the importance of getting a second unit and that leaving was not recommended and may lead to complications, including death. Patient understood and still wanted to leave.     Patient left AMA. Patient is a 60 yo M, with PMH of alcohol abuse, anemia, and anxiety, who came to the ED by sister for weakness and leg swelling. Patient was uncooperative during interview and full history unable to be obtained.    Patient recently here few days ago for low hemoglobin but refused transfusion and left AMA.    In ED, patient agreed for transfusion. Received 1 unit PRBC but refused to have second unit and wanted to leave. Patient was explained the risks and benefits of leaving against medical advice and explained the importance of getting a second unit and that leaving was not recommended and may lead to complications, including death. Patient understood and still wanted to leave.     Patient left AMA.

## 2020-01-27 NOTE — DISCHARGE NOTE PROVIDER - NSDCMRMEDTOKEN_GEN_ALL_CORE_FT
amLODIPine: 5 milligram(s) orally once a day  clonazePAM 0.5 mg oral tablet: 1 tab(s) orally 2 times a day  ergocalciferol 2000 intl units oral capsule: 1 cap(s) orally once a day  ferrous sulfate 325 mg (65 mg elemental iron) oral tablet: 1 tab(s) orally once a day   folic acid 1 mg oral tablet: 1 tab(s) orally once a day   folic acid 1 mg oral tablet: 1 tab(s) orally once a day  nicotine 21 mg/24 hr transdermal film, extended release: 21 milligram(s) transdermal once a day   PARoxetine: 10 milligram(s) orally once a day  senna 8.6 mg oral tablet: 1 tab(s) orally once a day   thiamine 100 mg oral tablet: 1 tab(s) orally once a day

## 2020-01-27 NOTE — DISCHARGE NOTE PROVIDER - HOSPITAL COURSE
Patient came to the ED by sister due to weakness and leg swelling for the past month. Patient was noted to have hemoglobin of 6 and given 1 unit PRBC.    Patient was to receive second unit but refused and wanted to leave. Patient was explained the risks of leaving and the complications that could occur,    including death. Patient understood and wanted to leave anyways.         Patient left AMA.

## 2020-01-28 ENCOUNTER — EMERGENCY (EMERGENCY)
Facility: HOSPITAL | Age: 60
LOS: 1 days | Discharge: HOME | End: 2020-01-28
Attending: EMERGENCY MEDICINE
Payer: COMMERCIAL

## 2020-01-28 VITALS
HEART RATE: 98 BPM | RESPIRATION RATE: 18 BRPM | HEIGHT: 68 IN | TEMPERATURE: 98 F | WEIGHT: 149.91 LBS | OXYGEN SATURATION: 98 % | DIASTOLIC BLOOD PRESSURE: 86 MMHG | SYSTOLIC BLOOD PRESSURE: 123 MMHG

## 2020-01-28 VITALS
OXYGEN SATURATION: 99 % | DIASTOLIC BLOOD PRESSURE: 70 MMHG | TEMPERATURE: 98 F | SYSTOLIC BLOOD PRESSURE: 118 MMHG | HEART RATE: 98 BPM | RESPIRATION RATE: 18 BRPM

## 2020-01-28 LAB
ANION GAP SERPL CALC-SCNC: 8 MMOL/L — SIGNIFICANT CHANGE UP (ref 5–17)
BASOPHILS # BLD AUTO: 0.07 K/UL — SIGNIFICANT CHANGE UP (ref 0–0.2)
BASOPHILS NFR BLD AUTO: 1 % — SIGNIFICANT CHANGE UP (ref 0–2)
BUN SERPL-MCNC: 22 MG/DL — HIGH (ref 7–18)
CALCIUM SERPL-MCNC: 8.1 MG/DL — LOW (ref 8.4–10.5)
CHLORIDE SERPL-SCNC: 100 MMOL/L — SIGNIFICANT CHANGE UP (ref 96–108)
CO2 SERPL-SCNC: 26 MMOL/L — SIGNIFICANT CHANGE UP (ref 22–31)
CREAT SERPL-MCNC: 1.04 MG/DL — SIGNIFICANT CHANGE UP (ref 0.5–1.3)
EOSINOPHIL # BLD AUTO: 0.1 K/UL — SIGNIFICANT CHANGE UP (ref 0–0.5)
EOSINOPHIL NFR BLD AUTO: 1.4 % — SIGNIFICANT CHANGE UP (ref 0–6)
GLUCOSE SERPL-MCNC: 107 MG/DL — HIGH (ref 70–99)
HCT VFR BLD CALC: 26.2 % — LOW (ref 39–50)
HGB BLD-MCNC: 7.5 G/DL — LOW (ref 13–17)
IMM GRANULOCYTES NFR BLD AUTO: 0.6 % — SIGNIFICANT CHANGE UP (ref 0–1.5)
LYMPHOCYTES # BLD AUTO: 0.86 K/UL — LOW (ref 1–3.3)
LYMPHOCYTES # BLD AUTO: 12.4 % — LOW (ref 13–44)
MCHC RBC-ENTMCNC: 21.7 PG — LOW (ref 27–34)
MCHC RBC-ENTMCNC: 28.6 GM/DL — LOW (ref 32–36)
MCV RBC AUTO: 75.7 FL — LOW (ref 80–100)
MONOCYTES # BLD AUTO: 0.66 K/UL — SIGNIFICANT CHANGE UP (ref 0–0.9)
MONOCYTES NFR BLD AUTO: 9.5 % — SIGNIFICANT CHANGE UP (ref 2–14)
NEUTROPHILS # BLD AUTO: 5.2 K/UL — SIGNIFICANT CHANGE UP (ref 1.8–7.4)
NEUTROPHILS NFR BLD AUTO: 75.1 % — SIGNIFICANT CHANGE UP (ref 43–77)
NRBC # BLD: 0 /100 WBCS — SIGNIFICANT CHANGE UP (ref 0–0)
PLATELET # BLD AUTO: 152 K/UL — SIGNIFICANT CHANGE UP (ref 150–400)
POTASSIUM SERPL-MCNC: 3.2 MMOL/L — LOW (ref 3.5–5.3)
POTASSIUM SERPL-SCNC: 3.2 MMOL/L — LOW (ref 3.5–5.3)
RBC # BLD: 3.46 M/UL — LOW (ref 4.2–5.8)
RBC # FLD: 25.3 % — HIGH (ref 10.3–14.5)
SODIUM SERPL-SCNC: 134 MMOL/L — LOW (ref 135–145)
WBC # BLD: 6.93 K/UL — SIGNIFICANT CHANGE UP (ref 3.8–10.5)
WBC # FLD AUTO: 6.93 K/UL — SIGNIFICANT CHANGE UP (ref 3.8–10.5)

## 2020-01-28 PROCEDURE — 86900 BLOOD TYPING SEROLOGIC ABO: CPT

## 2020-01-28 PROCEDURE — 36415 COLL VENOUS BLD VENIPUNCTURE: CPT

## 2020-01-28 PROCEDURE — P9040: CPT

## 2020-01-28 PROCEDURE — 85027 COMPLETE CBC AUTOMATED: CPT

## 2020-01-28 PROCEDURE — 99285 EMERGENCY DEPT VISIT HI MDM: CPT

## 2020-01-28 PROCEDURE — 86923 COMPATIBILITY TEST ELECTRIC: CPT

## 2020-01-28 PROCEDURE — 36430 TRANSFUSION BLD/BLD COMPNT: CPT

## 2020-01-28 PROCEDURE — 80048 BASIC METABOLIC PNL TOTAL CA: CPT

## 2020-01-28 PROCEDURE — 86850 RBC ANTIBODY SCREEN: CPT

## 2020-01-28 PROCEDURE — 86901 BLOOD TYPING SEROLOGIC RH(D): CPT

## 2020-01-28 PROCEDURE — 99285 EMERGENCY DEPT VISIT HI MDM: CPT | Mod: 25

## 2020-01-28 NOTE — ED PROVIDER NOTE - PHYSICAL EXAMINATION
Afebrile, hemodynamically stable, saturating well  NAD, well appearing  Head NCAT  EOMI grossly  MMM  No JVD  RRR, nml S1/S2, no m/r/g  Lungs CTAB, no w/r/r  Abd soft, NT, ND, nml BS, no rebound or guarding  AAO, CN's 3-12 grossly intact  IZAGUIRRE spontaneously, no leg cyanosis or edema  Skin warm, well perfused, no rashes or hives

## 2020-01-28 NOTE — ED PROVIDER NOTE - CLINICAL SUMMARY MEDICAL DECISION MAKING FREE TEXT BOX
Noted Hb 7.5 improved from prior. Pt denies all symptoms and despite son's report that pt has been weak and difficulty walking, he was noted to be walking all around the ED without any e/o dizziness or weakness. Had extensive d/w son and pt. Offered 1u PRBC but declines. He is AAO, hemodynamically stable, well appearing, displays appropriate decision making capacity and in fact has been discharged AMA recently as well. Understands concern for anemia and weakness and declines at this time. Patient is well appearing, NAD, afebrile, hemodynamically stable. Discharged with instructions in further symptomatic care, return precautions, and need for close PMD f/u which he states he will do. Noted Hb 7.5 improved from prior. Pt denies all symptoms and despite son's report that pt has been weak and difficulty walking, he was noted to be walking all around the ED without any e/o dizziness or weakness. Had extensive d/w son and pt. Offered 1u PRBC but declines. He is AAO, hemodynamically stable, well appearing, displays appropriate decision making capacity and in fact has been discharged AMA recently as well. Understands concern for anemia and weakness and declines at this time. Patient is well appearing, NAD, afebrile, hemodynamically stable. Discharged with instructions in further symptomatic care, return precautions, and need for close PMD f/u which he states he will do.  ADDENDUM: Pt consents to stay for bloodwork after being discharged. Given 1u PRBC. During transfusion reported HA, declines analgesia, well appearing, afebrile, no other suspicion to suggest transfusion reaction. Transfusion slowed down and pt completed transfusion without incidence. Patient is well appearing, NAD, afebrile, hemodynamically stable. Discharged with instructions in further symptomatic care, return precautions, and need for PMD f/u.

## 2020-01-28 NOTE — ED PROVIDER NOTE - OBJECTIVE STATEMENT
58 y/o male with PMHx of HTN presents to the ED for a blood transfusion. Pt states that he was told to come to the ED for a transfusion due to a low blood count. Pt denies any chest pain, SOB, leg pain/swelling, rectal bleeding, and pain, Hx of liver issues, Hx of blood transfusions, or other complaints. Pt had a colonoscopy 1 year ago at [facility] with normal results. Pt intermittently smokes cigarettes and intermittently drinks EtOH (last EtOH 1 month ago). PMD is Dr. Erickson. 60 y/o male with PMHx of HTN presents to the ED for a blood transfusion. Pt states that he was told to come to the ED for a transfusion due to a low blood count. Pt denies any chest pain, SOB, leg pain/swelling, rectal bleeding, Hx of liver issues, or other complaints. Pt states he had a colonoscopy 1 year ago at [facility] with normal results. Pt intermittently smokes cigarettes and intermittently drinks EtOH, denies recent EtOH use. PMD is Dr. Erickson and states was sent in for this a few days ago. Of note, pt left AMA last time after 1u PRBC transfusion a few days ago.

## 2020-01-28 NOTE — ED ADULT NURSE NOTE - NSIMPLEMENTINTERV_GEN_ALL_ED
Implemented All Universal Safety Interventions:  [place] to call system. Call bell, personal items and telephone within reach. Instruct patient to call for assistance. Room bathroom lighting operational. Non-slip footwear when patient is off stretcher. Physically safe environment: no spills, clutter or unnecessary equipment. Stretcher in lowest position, wheels locked, appropriate side rails in place.

## 2020-01-28 NOTE — ED PROVIDER NOTE - PATIENT PORTAL LINK FT
You can access the FollowMyHealth Patient Portal offered by [facility] by registering at the following website: [email or web address]. By joining [employer]’s FollowMyHealth portal, you will also be able to view your health information using other applications (apps) compatible with our system. You can access the FollowMyHealth Patient Portal offered by [facility] by registering at the following website: [email or web address]. By joining [employer]’s FollowMyHealth portal, you will also be able to view your health information using other applications (apps) compatible with our system.

## 2020-01-28 NOTE — ED ADULT TRIAGE NOTE - CHIEF COMPLAINT QUOTE
Pt reports been in ED Sunday and left after 1UNIT PRBC. Signed AMA for 2nd unit as felt the blood was making him have diarrhea. Returning now for blood transfusion

## 2020-01-28 NOTE — ED ADULT NURSE NOTE - OBJECTIVE STATEMENT
Pt came in for abnormal lab result. pt signed out AMA on 1/26/20 while receiving blood transfusion for low h/h. Pt received 1 unit on 1/26 and left AMA prior to receiving 2nd unit b/c he claims it was making have diarrhea. Pt returned back accompanied by son for 2nd unit. pt denies any distress, denies cp, sob, palpitations, HA, dizziness. breathing unlabored. NAD. Pt came in for abnormal lab result. pt signed out AMA on 1/26/20 while receiving blood transfusion for low h/h. Pt received 1 unit on 1/26 and left AMA prior to receiving 2nd unit b/c he claims it was making have diarrhea. Pt returned back accompanied by son for 2nd unit. pt denies any distress, denies cp, sob, palpitations, HA, dizziness. breathing unlabored. NAD. Pt is accompanied by son, and as per son pt is an alcoholic, but the pt endorses last drink was 1 month ago. AOB noted.

## 2020-01-28 NOTE — ED PROVIDER NOTE - NSFOLLOWUPINSTRUCTIONS_ED_ALL_ED_FT
Please follow up with your primary care doctor in 1-2 days.  Please return to the emergency department if you have trouble breathing, chest pain, dizziness, or any other symptoms.

## 2020-02-12 ENCOUNTER — EMERGENCY (EMERGENCY)
Facility: HOSPITAL | Age: 60
LOS: 1 days | Discharge: HOME | End: 2020-02-12
Attending: EMERGENCY MEDICINE
Payer: COMMERCIAL

## 2020-02-12 VITALS
RESPIRATION RATE: 16 BRPM | SYSTOLIC BLOOD PRESSURE: 133 MMHG | TEMPERATURE: 98 F | DIASTOLIC BLOOD PRESSURE: 84 MMHG | WEIGHT: 160.06 LBS | HEART RATE: 92 BPM | OXYGEN SATURATION: 98 %

## 2020-02-12 LAB
ALBUMIN SERPL ELPH-MCNC: 2.6 G/DL — LOW (ref 3.5–5)
ALP SERPL-CCNC: 188 U/L — HIGH (ref 40–120)
ALT FLD-CCNC: 31 U/L DA — SIGNIFICANT CHANGE UP (ref 10–60)
ANION GAP SERPL CALC-SCNC: 9 MMOL/L — SIGNIFICANT CHANGE UP (ref 5–17)
AST SERPL-CCNC: 59 U/L — HIGH (ref 10–40)
BILIRUB SERPL-MCNC: 1 MG/DL — SIGNIFICANT CHANGE UP (ref 0.2–1.2)
BUN SERPL-MCNC: 19 MG/DL — HIGH (ref 7–18)
CALCIUM SERPL-MCNC: 8 MG/DL — LOW (ref 8.4–10.5)
CHLORIDE SERPL-SCNC: 104 MMOL/L — SIGNIFICANT CHANGE UP (ref 96–108)
CO2 SERPL-SCNC: 26 MMOL/L — SIGNIFICANT CHANGE UP (ref 22–31)
CREAT SERPL-MCNC: 1.04 MG/DL — SIGNIFICANT CHANGE UP (ref 0.5–1.3)
GLUCOSE SERPL-MCNC: 99 MG/DL — SIGNIFICANT CHANGE UP (ref 70–99)
HCT VFR BLD CALC: 25.8 % — LOW (ref 39–50)
HGB BLD-MCNC: 7.5 G/DL — LOW (ref 13–17)
MCHC RBC-ENTMCNC: 23.5 PG — LOW (ref 27–34)
MCHC RBC-ENTMCNC: 29.1 GM/DL — LOW (ref 32–36)
MCV RBC AUTO: 80.9 FL — SIGNIFICANT CHANGE UP (ref 80–100)
NRBC # BLD: 0 /100 WBCS — SIGNIFICANT CHANGE UP (ref 0–0)
NT-PROBNP SERPL-SCNC: 2967 PG/ML — HIGH (ref 0–125)
PLATELET # BLD AUTO: 202 K/UL — SIGNIFICANT CHANGE UP (ref 150–400)
POTASSIUM SERPL-MCNC: 2.7 MMOL/L — CRITICAL LOW (ref 3.5–5.3)
POTASSIUM SERPL-SCNC: 2.7 MMOL/L — CRITICAL LOW (ref 3.5–5.3)
PROT SERPL-MCNC: 6.9 G/DL — SIGNIFICANT CHANGE UP (ref 6–8.3)
RBC # BLD: 3.19 M/UL — LOW (ref 4.2–5.8)
RBC # FLD: 28.2 % — HIGH (ref 10.3–14.5)
SODIUM SERPL-SCNC: 139 MMOL/L — SIGNIFICANT CHANGE UP (ref 135–145)
WBC # BLD: 6.14 K/UL — SIGNIFICANT CHANGE UP (ref 3.8–10.5)
WBC # FLD AUTO: 6.14 K/UL — SIGNIFICANT CHANGE UP (ref 3.8–10.5)

## 2020-02-12 PROCEDURE — 99283 EMERGENCY DEPT VISIT LOW MDM: CPT

## 2020-02-12 PROCEDURE — 71045 X-RAY EXAM CHEST 1 VIEW: CPT | Mod: 26

## 2020-02-12 RX ORDER — POTASSIUM CHLORIDE 20 MEQ
40 PACKET (EA) ORAL ONCE
Refills: 0 | Status: COMPLETED | OUTPATIENT
Start: 2020-02-12 | End: 2020-02-12

## 2020-02-12 NOTE — ED ADULT NURSE NOTE - OBJECTIVE STATEMENT
Pt c/o of bilateral lower extremities swelling started 1 week ago after getting blood transfusion. Pt c/o of burning and tightness of bilateral lower extremities making it difficulty for him to ambulate. pt refused to take off his shoes for assessment of the feet

## 2020-02-13 VITALS
OXYGEN SATURATION: 95 % | HEART RATE: 89 BPM | RESPIRATION RATE: 17 BRPM | SYSTOLIC BLOOD PRESSURE: 128 MMHG | TEMPERATURE: 98 F | DIASTOLIC BLOOD PRESSURE: 74 MMHG

## 2020-02-13 PROCEDURE — 86900 BLOOD TYPING SEROLOGIC ABO: CPT

## 2020-02-13 PROCEDURE — 86850 RBC ANTIBODY SCREEN: CPT

## 2020-02-13 PROCEDURE — 83880 ASSAY OF NATRIURETIC PEPTIDE: CPT

## 2020-02-13 PROCEDURE — 80053 COMPREHEN METABOLIC PANEL: CPT

## 2020-02-13 PROCEDURE — 36415 COLL VENOUS BLD VENIPUNCTURE: CPT

## 2020-02-13 PROCEDURE — 86901 BLOOD TYPING SEROLOGIC RH(D): CPT

## 2020-02-13 PROCEDURE — 99285 EMERGENCY DEPT VISIT HI MDM: CPT | Mod: 25

## 2020-02-13 PROCEDURE — 71045 X-RAY EXAM CHEST 1 VIEW: CPT

## 2020-02-13 PROCEDURE — 85027 COMPLETE CBC AUTOMATED: CPT

## 2020-02-13 RX ADMIN — Medication 40 MILLIEQUIVALENT(S): at 00:05

## 2020-02-13 NOTE — ED PROVIDER NOTE - PATIENT PORTAL LINK FT
You can access the FollowMyHealth Patient Portal offered by [facility] by registering at the following website: [email or web address]. By joining [employer]’s FollowMyHealth portal, you will also be able to view your health information using other applications (apps) compatible with our system.

## 2020-02-13 NOTE — ED PROVIDER NOTE - NS ED ROS FT
CONSTITUTIONAL: no fever, no chills   EYES: no visual changes, no eye pain   ENMT: no nasal congestion, no throat pain  CARDIOVASCULAR: no chest pain, +edema, no palpitations   RESPIRATORY: no shortness of breath, no cough   GASTROINTESTINAL: no abdominal pain, no nausea, no vomiting, no diarrhea, no constipation   GENITOURINARY: no dysuria, no frequency  MUSCULOSKELETAL: no joint pains, no myalgias, no back pain   SKIN: no rashes  NEUROLOGICAL: no weakness, no headache, no dizziness, no slurred speech, no syncope   PSYCHIATRIC: +anxiety, +etoh abuse   HEME/LYMPH: no lymphadenopathy      All other ROS negative except as per HPI

## 2020-02-13 NOTE — ED PROVIDER NOTE - OBJECTIVE STATEMENT
60 yo M pmh of anemia and etoh abuse presents with LE swelling and etoh intox. Pt denies acute complaints. Son called and is concerned about b/l leg swelling and pt not taking water pill.

## 2020-02-13 NOTE — ED PROVIDER NOTE - NSFOLLOWUPINSTRUCTIONS_ED_ALL_ED_FT
ALCOHOL INTOXICATION - AfterCare(R) Instructions(ER/ED)     Alcohol Intoxication    WHAT YOU NEED TO KNOW:    Alcohol intoxication is a harmful physical condition caused when you drink more alcohol than your body can handle. It is also called ethanol poisoning, or being drunk.    DISCHARGE INSTRUCTIONS:    Call your local emergency number (911 in the [place]) if:     You have sudden trouble breathing or chest pain.      You have a seizure.      You feel sad enough to harm yourself or others.    Call your doctor if:     You have hallucinations (you see or hear things that are not real).      You cannot stop vomiting.      You have questions or concerns about your condition or care.    Recommended alcohol limits:     Men 21 to 64 years should limit alcohol to 2 drinks a day. Do not have more than 4 drinks in 1 day or more than 14 in 1 week.      All women, and men 65 or older should limit alcohol to 1 drink in a day. Do not have more than 3 drinks in 1 day or more than 7 in 1 week. No amount of alcohol is okay during pregnancy.    Manage alcohol use:     Decrease the amount you drink. This can help prevent health problems such as brain, heart, and liver damage, high blood pressure, diabetes, and cancer. If you cannot stop completely, healthcare providers can help you set goals to decrease the amount you drink.      Plan weekly alcohol use. You will be less likely to drink more than the recommended limit if you plan ahead.      Have food when you drink alcohol. Food will prevent alcohol from getting into your system too quickly. Eat before you have your first alcohol drink.      Time your drinks carefully. Have no more than 1 drink in an hour. Have a liquid such as water, coffee, or a soft drink between alcohol drinks.      Do not drive if you have had alcohol. Make sure someone who has not been drinking can help you get home.      Do not drink alcohol if you are taking medicine. Alcohol is dangerous when you combine it with certain medicines, such as acetaminophen or blood pressure medicine. Talk to your healthcare provider about all the medicines you currently take.    For more information:     Alcoholics Anonymous  Web Address: http://www.aa.org      Substance Abuse and Mental Health Services Administration  [address]  [place] [address]  Web Address: http://www.[facility].gov      Follow up with your healthcare provider as directed: Write down your questions so you remember to ask them during your visits.        © Copyright [employer] 2020       back to top                      © Copyright [employer] 2020

## 2020-02-13 NOTE — ED PROVIDER NOTE - CLINICAL SUMMARY MEDICAL DECISION MAKING FREE TEXT BOX
58 yo M with etoh abuse, non compliance with diuretic. Laying flat on stretcher, no increased work of breathing, lungs clear. CXR at baseline without significant congestion. Clinically sober after observation in ED. K 2.7 (repleted PO in ED), Hgb 7.5, bnp elevated. Encouraged etoh cessation and compliance with diuretic. Will dc with PCP fu. Discussed indications for patient return to ED. Patient understood.

## 2020-02-13 NOTE — ED PROVIDER NOTE - PHYSICAL EXAMINATION
GENERAL: well appearing, no acute distress, etoh on breath   HEAD: atraumatic   EYES: EOMI, pink conjunctiva   ENT: moist oral mucosa   CARDIAC: RRR, +trace b/l LE edema, distal pulses present   RESPIRATORY: lungs CTAB, no increased work of breathing   GASTROINTESTINAL: no abdominal tenderness, no rebound or guarding, bowel sounds presents  GENITOURINARY: no CVA tenderness   MUSCULOSKELETAL: no deformity   NEUROLOGICAL: AAOx3, spontaneous movement of extremities   SKIN: intact   PSYCHIATRIC: cooperative  HEME LYMPH: no lymphadenopathy

## 2021-06-05 NOTE — H&P ADULT - NSHPLANGLIMITEDENGLISH_GEN_A_CORE
No , lives with wife, received 2nd covid vaccine ~1 month ago  Smokes 0.25 ppd since 2003, has smoked for ~40 years, denies ETOH or drug use

## 2021-07-20 NOTE — ED ADULT NURSE NOTE - PAIN RATING/NUMBER SCALE (0-10): ACTIVITY
0 Island Pedicle Flap With Canthal Suspension Text: The defect edges were debeveled with a #15 scalpel blade.  Given the location of the defect, shape of the defect and the proximity to free margins an island pedicle advancement flap was deemed most appropriate.  Using a sterile surgical marker, an appropriate advancement flap was drawn incorporating the defect, outlining the appropriate donor tissue and placing the expected incisions within the relaxed skin tension lines where possible. The area thus outlined was incised deep to adipose tissue with a #15 scalpel blade.  The skin margins were undermined to an appropriate distance in all directions around the primary defect and laterally outward around the island pedicle utilizing iris scissors.  There was minimal undermining beneath the pedicle flap. A suspension suture was placed in the canthal tendon to prevent tension and prevent ectropion.

## 2023-05-12 NOTE — ED PROVIDER NOTE - TEMPLATE
Fluid/Electrolyte/Metabolic
exhibits no distension. There is no tenderness. Musculoskeletal:  Patient exhibits no edema and tenderness. Patient exhibits no deformity. SKIN:  Intact without rashes, lesions or ulcerations. No obvious deformity or swelling. NEURO: Musculoskeletal and axillary nerves intact to sensory and motor testing. EYES:  Extraocular muscles intact. MOUTH: Oral mucosa moist.  No perioral lesions. PULM:  Respirations unlabored and regular. VASC:  Capillary refill less than 3 seconds. Cervical spine ROM WNL  Spurlings: negative,   MSK:  Forward elevation WNL degrees, external rotation in neutral WNL degrees, abduction WNL degrees, internal rotation to WNL. Supraspinatus 5/5   External rotators 5/5  Internal 5/5  Full Can negative   Empty Can negative   Neer's test negative   Li-Jesus Alberto test. negative. Pain with cross body adduction negative. Anterior Labral Stress test negative. Pain over AC joint positive. Pain over traps/rhomboids negative. PSYCH:  Patient has good fund of knowledge and displays understanding of exam.  Neurological: Patient is alert and oriented to person, place, and time. Skin: Skin is warm and dry. Patient is not diaphoretic. Psychiatric: Patient's speech is normal and behavior is normal. Thought content normal.   Vitals reviewed. Lab Results   Component Value Date    WBC 5.1 02/04/2014    HGB 13.2 02/04/2014    HCT 40.7 02/04/2014     02/04/2014    TSH 1.71 07/10/2020    GLUF 92 07/10/2020     No results found for: CALCIUM, PHOS  No results found for: LDLCALC, LDLCHOLESTEROL, LDLDIRECT    Please note that this chart was generated using voice recognition Dragon dictation software. Although every effort was made to ensure the accuracy of this automated transcription, some errors in transcription may have occurred.     Electronically signed by Dr. Johnathan Cevallos MD on 5/12/2023 at 8:50 AM

## 2023-06-20 NOTE — PROGRESS NOTE ADULT - PROBLEM SELECTOR PLAN 4
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  Outpatient hepatology referral
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  Outpatient hepatology referral
Patient
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
## 2024-05-01 NOTE — ED PROVIDER NOTE - NS ED MD EM SELECTION
Both eyes (OU) Contact Lens Order  Quantity: 4 Package: TRIAL Appointment needed? No Medically necessary? Yes Ship To: Home Additional instructions: Please order 2 trial packs of MyDay OD/MyDay Toric for each eye. The power he needs for OS is not available in [employer] for astigmatism at this time. If he doesn't like the MyDay we will switch to Biof[employer]. The patient signed an ABN and I entered the charges in Epic for the MyDay since that is the more expensive lens. He will call you to let you know what he likes and then we will order! Thank you! 
[id number] Comprehensive

## 2024-07-17 NOTE — PROGRESS NOTE ADULT - PROBLEM SELECTOR PROBLEM 4
No care due was identified.  [facility] Embedded Care Due Messages. Reference number: [id number].   7/16/2024 11:50:22 PM CDT  
Liver cirrhosis, alcoholic

## 2024-12-05 NOTE — ED ADULT NURSE NOTE - OBJECTIVE STATEMENT
Sent patient portal message. pt c/o bilateral lower leg swelling , sob ,general weakness and dry cough x a week. pitting edema of +2

## 2025-03-04 NOTE — ED ADULT NURSE NOTE - CAS TRG GENERAL AIRWAY, MLM
Madiha Orantes discharged to home accompanied by .   Patient provided with the following educational materials upon discharge:  AVS.   Valuables and belongings sent with patient.   discharge summary, discharge instructions, medications, and follow up appointments reviewed with patient and .  Patient and  verbalized understanding   Patent